# Patient Record
Sex: FEMALE | Race: WHITE | Employment: PART TIME | URBAN - METROPOLITAN AREA
[De-identification: names, ages, dates, MRNs, and addresses within clinical notes are randomized per-mention and may not be internally consistent; named-entity substitution may affect disease eponyms.]

---

## 2017-05-18 PROBLEM — R19.7 DIARRHEA: Status: ACTIVE | Noted: 2017-05-18

## 2017-11-15 PROBLEM — R76.8 ANA POSITIVE: Status: ACTIVE | Noted: 2017-11-15

## 2017-12-19 DIAGNOSIS — R30.0 DYSURIA: ICD-10-CM

## 2017-12-21 LAB — URINE CULTURE, ROUTINE: NORMAL

## 2017-12-28 DIAGNOSIS — R30.0 DYSURIA: ICD-10-CM

## 2017-12-30 LAB — URINE CULTURE, ROUTINE: NORMAL

## 2018-01-08 DIAGNOSIS — N30.00 ACUTE CYSTITIS WITHOUT HEMATURIA: ICD-10-CM

## 2018-01-08 DIAGNOSIS — R30.0 DYSURIA: ICD-10-CM

## 2018-01-08 LAB
C. TRACHOMATIS DNA ,URINE: NORMAL
N. GONORRHOEAE DNA, URINE: NORMAL

## 2018-01-10 LAB — URINE CULTURE, ROUTINE: NORMAL

## 2018-01-12 LAB
C. TRACHOMATIS DNA ,URINE: NEGATIVE
N. GONORRHOEAE DNA, URINE: NEGATIVE

## 2018-02-05 DIAGNOSIS — R30.0 DYSURIA: ICD-10-CM

## 2018-02-07 LAB — URINE CULTURE, ROUTINE: NORMAL

## 2018-03-14 DIAGNOSIS — R30.0 DYSURIA: ICD-10-CM

## 2018-03-16 LAB — URINE CULTURE, ROUTINE: NORMAL

## 2018-05-04 DIAGNOSIS — B37.31 CANDIDA VAGINITIS: ICD-10-CM

## 2018-05-04 DIAGNOSIS — R30.0 DYSURIA: ICD-10-CM

## 2018-05-06 LAB — URINE CULTURE, ROUTINE: NORMAL

## 2018-05-14 ENCOUNTER — PREP FOR PROCEDURE (OUTPATIENT)
Dept: SURGERY | Age: 23
End: 2018-05-14

## 2018-05-21 PROBLEM — D56.3 THALASSEMIA TRAIT: Chronic | Status: ACTIVE | Noted: 2018-05-21

## 2018-05-21 PROBLEM — R22.2 ABDOMINAL WALL MASS: Chronic | Status: ACTIVE | Noted: 2018-05-21

## 2018-06-13 DIAGNOSIS — N30.00 ACUTE CYSTITIS WITHOUT HEMATURIA: ICD-10-CM

## 2018-06-13 PROBLEM — N39.0 RECURRENT UTI: Status: ACTIVE | Noted: 2018-06-13

## 2018-06-15 LAB — URINE CULTURE, ROUTINE: NORMAL

## 2018-08-15 DIAGNOSIS — N39.0 RECURRENT UTI: ICD-10-CM

## 2018-08-17 LAB — URINE CULTURE, ROUTINE: NORMAL

## 2018-11-05 DIAGNOSIS — M25.50 ARTHRALGIA, UNSPECIFIED JOINT: ICD-10-CM

## 2018-11-05 DIAGNOSIS — Z86.61 HISTORY OF MENINGITIS: ICD-10-CM

## 2018-11-05 DIAGNOSIS — M79.7 FIBROMYALGIA: ICD-10-CM

## 2018-11-05 LAB
ALBUMIN SERPL-MCNC: 4.7 G/DL (ref 3.9–4.9)
ALP BLD-CCNC: 50 U/L (ref 40–130)
ALT SERPL-CCNC: 15 U/L (ref 0–33)
ANION GAP SERPL CALCULATED.3IONS-SCNC: 17 MEQ/L (ref 7–13)
AST SERPL-CCNC: 23 U/L (ref 0–35)
BILIRUB SERPL-MCNC: 0.8 MG/DL (ref 0–1.2)
BUN BLDV-MCNC: 12 MG/DL (ref 6–20)
CALCIUM SERPL-MCNC: 9.9 MG/DL (ref 8.6–10.2)
CHLORIDE BLD-SCNC: 105 MEQ/L (ref 98–107)
CO2: 19 MEQ/L (ref 22–29)
CREAT SERPL-MCNC: 0.58 MG/DL (ref 0.5–0.9)
GFR AFRICAN AMERICAN: >60
GFR NON-AFRICAN AMERICAN: >60
GLOBULIN: 3.2 G/DL (ref 2.3–3.5)
GLUCOSE BLD-MCNC: 78 MG/DL (ref 74–109)
HCT VFR BLD CALC: 35.2 % (ref 37–47)
HEMOGLOBIN: 11 G/DL (ref 12–16)
MCH RBC QN AUTO: 20.7 PG (ref 27–31.3)
MCHC RBC AUTO-ENTMCNC: 31.3 % (ref 33–37)
MCV RBC AUTO: 66 FL (ref 82–100)
PDW BLD-RTO: 15.1 % (ref 11.5–14.5)
PLATELET # BLD: 332 K/UL (ref 130–400)
POTASSIUM SERPL-SCNC: 4.2 MEQ/L (ref 3.5–5.1)
RBC # BLD: 5.34 M/UL (ref 4.2–5.4)
SODIUM BLD-SCNC: 141 MEQ/L (ref 132–144)
T4 FREE: 1.53 NG/DL (ref 0.93–1.7)
TOTAL PROTEIN: 7.9 G/DL (ref 6.4–8.1)
TSH SERPL DL<=0.05 MIU/L-ACNC: 1.81 UIU/ML (ref 0.27–4.2)
WBC # BLD: 6.6 K/UL (ref 4.8–10.8)

## 2018-11-08 LAB — LYME, EIA: 0.7 LIV (ref 0–1.2)

## 2018-12-06 DIAGNOSIS — N30.00 ACUTE CYSTITIS WITHOUT HEMATURIA: ICD-10-CM

## 2018-12-08 LAB — URINE CULTURE, ROUTINE: NORMAL

## 2019-02-05 DIAGNOSIS — N89.8 VAGINAL IRRITATION: ICD-10-CM

## 2019-02-07 LAB
C TRACH DNA GENITAL QL NAA+PROBE: NEGATIVE
GENITAL CULTURE, ROUTINE: NORMAL
N. GONORRHOEAE DNA: NEGATIVE

## 2019-04-12 DIAGNOSIS — R35.0 FREQUENCY OF URINATION: ICD-10-CM

## 2019-04-14 LAB — URINE CULTURE, ROUTINE: NORMAL

## 2021-12-02 DIAGNOSIS — N89.8 VAGINAL DISCHARGE: ICD-10-CM

## 2021-12-02 LAB
BACTERIA: ABNORMAL /HPF
BILIRUBIN URINE: NEGATIVE
BLOOD, URINE: NEGATIVE
CLARITY: CLEAR
COLOR: YELLOW
EPITHELIAL CELLS, UA: ABNORMAL /HPF (ref 0–5)
GLUCOSE URINE: NEGATIVE MG/DL
HYALINE CASTS: ABNORMAL /HPF (ref 0–5)
KETONES, URINE: NEGATIVE MG/DL
LEUKOCYTE ESTERASE, URINE: ABNORMAL
NITRITE, URINE: NEGATIVE
PH UA: 7.5 (ref 5–9)
PROTEIN UA: NEGATIVE MG/DL
RBC UA: ABNORMAL /HPF (ref 0–5)
SPECIFIC GRAVITY UA: 1 (ref 1–1.03)
UROBILINOGEN, URINE: 0.2 E.U./DL
WBC UA: ABNORMAL /HPF (ref 0–5)

## 2021-12-03 LAB
CLUE CELLS: NORMAL
TRICHOMONAS PREP: NORMAL
TRICHOMONAS VAGINALIS SCREEN: NEGATIVE
YEAST WET PREP: NORMAL

## 2021-12-04 LAB — URINE CULTURE, ROUTINE: NORMAL

## 2023-04-11 ENCOUNTER — OFFICE VISIT (OUTPATIENT)
Dept: PRIMARY CARE | Facility: CLINIC | Age: 28
End: 2023-04-11
Payer: COMMERCIAL

## 2023-04-11 VITALS
BODY MASS INDEX: 30.4 KG/M2 | DIASTOLIC BLOOD PRESSURE: 84 MMHG | TEMPERATURE: 98.7 F | WEIGHT: 161 LBS | OXYGEN SATURATION: 100 % | RESPIRATION RATE: 16 BRPM | HEART RATE: 75 BPM | HEIGHT: 61 IN | SYSTOLIC BLOOD PRESSURE: 118 MMHG

## 2023-04-11 DIAGNOSIS — R06.02 SHORTNESS OF BREATH: ICD-10-CM

## 2023-04-11 DIAGNOSIS — E53.8 B12 DEFICIENCY: ICD-10-CM

## 2023-04-11 DIAGNOSIS — R60.0 BILATERAL LOWER EXTREMITY EDEMA: ICD-10-CM

## 2023-04-11 DIAGNOSIS — R42 DIZZY SPELLS: Primary | ICD-10-CM

## 2023-04-11 PROBLEM — M51.36 BULGING LUMBAR DISC: Status: ACTIVE | Noted: 2023-04-11

## 2023-04-11 PROBLEM — E66.3 OVERWEIGHT (BMI 25.0-29.9): Status: ACTIVE | Noted: 2023-04-11

## 2023-04-11 PROBLEM — M79.7 FIBROMYALGIA: Status: ACTIVE | Noted: 2023-04-11

## 2023-04-11 PROBLEM — M51.369 BULGING LUMBAR DISC: Status: ACTIVE | Noted: 2023-04-11

## 2023-04-11 PROBLEM — R10.2 FEMALE PELVIC PAIN: Status: ACTIVE | Noted: 2023-04-11

## 2023-04-11 PROBLEM — E73.9 ADULT LACTASE DEFICIENCY: Status: ACTIVE | Noted: 2023-04-11

## 2023-04-11 PROBLEM — M54.9 CHRONIC BACK PAIN: Status: ACTIVE | Noted: 2023-04-11

## 2023-04-11 PROBLEM — G89.29 CHRONIC RUQ PAIN: Status: ACTIVE | Noted: 2023-04-11

## 2023-04-11 PROBLEM — B37.31 CANDIDIASIS OF VAGINA: Status: ACTIVE | Noted: 2023-04-11

## 2023-04-11 PROBLEM — R10.11 CHRONIC RUQ PAIN: Status: ACTIVE | Noted: 2023-04-11

## 2023-04-11 PROBLEM — F43.0 PANIC ATTACK AS REACTION TO STRESS: Status: ACTIVE | Noted: 2023-04-11

## 2023-04-11 PROBLEM — R13.10 DYSPHAGIA, IDIOPATHIC: Status: ACTIVE | Noted: 2023-04-11

## 2023-04-11 PROBLEM — E28.2 PCOS (POLYCYSTIC OVARIAN SYNDROME): Status: ACTIVE | Noted: 2023-04-11

## 2023-04-11 PROBLEM — D64.9 ANEMIA: Status: ACTIVE | Noted: 2023-04-11

## 2023-04-11 PROBLEM — L70.9 ACNE: Status: ACTIVE | Noted: 2023-04-11

## 2023-04-11 PROBLEM — M21.70 LEG LENGTH DISCREPANCY: Status: ACTIVE | Noted: 2023-04-11

## 2023-04-11 PROBLEM — N94.3 PMS (PREMENSTRUAL SYNDROME): Status: ACTIVE | Noted: 2023-04-11

## 2023-04-11 PROBLEM — G43.709 MIGRAINE, CHRONIC, WITHOUT AURA: Status: ACTIVE | Noted: 2023-04-11

## 2023-04-11 PROBLEM — F41.0 PANIC ATTACK AS REACTION TO STRESS: Status: ACTIVE | Noted: 2023-04-11

## 2023-04-11 PROBLEM — E66.811 CLASS 1 OBESITY DUE TO EXCESS CALORIES WITH BODY MASS INDEX (BMI) OF 30.0 TO 30.9 IN ADULT: Status: ACTIVE | Noted: 2023-04-11

## 2023-04-11 PROBLEM — E55.9 VITAMIN D DEFICIENCY: Status: ACTIVE | Noted: 2023-04-11

## 2023-04-11 PROBLEM — M54.16 LUMBAR RADICULOPATHY: Status: ACTIVE | Noted: 2023-04-11

## 2023-04-11 PROBLEM — Z86.2 H/O THALASSEMIA MINOR: Status: ACTIVE | Noted: 2023-04-11

## 2023-04-11 PROBLEM — K21.9 GERD (GASTROESOPHAGEAL REFLUX DISEASE): Status: ACTIVE | Noted: 2023-04-11

## 2023-04-11 PROBLEM — R39.9 UTI SYMPTOMS: Status: ACTIVE | Noted: 2023-04-11

## 2023-04-11 PROBLEM — E66.09 CLASS 1 OBESITY DUE TO EXCESS CALORIES WITH BODY MASS INDEX (BMI) OF 30.0 TO 30.9 IN ADULT: Status: ACTIVE | Noted: 2023-04-11

## 2023-04-11 PROBLEM — B37.31 RECURRENT CANDIDIASIS OF VAGINA: Status: ACTIVE | Noted: 2023-04-11

## 2023-04-11 PROBLEM — F41.9 ANXIETY DISORDER: Status: ACTIVE | Noted: 2023-04-11

## 2023-04-11 PROBLEM — R82.998 URINE WBC INCREASED: Status: ACTIVE | Noted: 2023-04-11

## 2023-04-11 PROBLEM — R53.83 FATIGUE: Status: ACTIVE | Noted: 2023-04-11

## 2023-04-11 PROBLEM — G89.29 CHRONIC BACK PAIN: Status: ACTIVE | Noted: 2023-04-11

## 2023-04-11 PROBLEM — R31.9 HEMATURIA: Status: ACTIVE | Noted: 2023-04-11

## 2023-04-11 PROBLEM — J32.0 MAXILLARY SINUSITIS: Status: ACTIVE | Noted: 2023-04-11

## 2023-04-11 PROBLEM — F41.8 SITUATIONAL ANXIETY: Status: ACTIVE | Noted: 2023-04-11

## 2023-04-11 PROBLEM — R00.2 HEART PALPITATIONS: Status: ACTIVE | Noted: 2023-04-11

## 2023-04-11 PROBLEM — E78.2 MIXED HYPERLIPIDEMIA: Status: ACTIVE | Noted: 2023-04-11

## 2023-04-11 PROBLEM — N39.0 FREQUENT UTI: Status: ACTIVE | Noted: 2023-04-11

## 2023-04-11 PROBLEM — R94.6 ABNORMAL THYROID FUNCTION TEST: Status: ACTIVE | Noted: 2023-04-11

## 2023-04-11 PROBLEM — J34.3 HYPERTROPHY OF BOTH INFERIOR NASAL TURBINATES: Status: ACTIVE | Noted: 2023-04-11

## 2023-04-11 PROBLEM — J34.2 NASAL SEPTAL DEVIATION: Status: ACTIVE | Noted: 2023-04-11

## 2023-04-11 PROBLEM — M53.86 SCIATICA OF LEFT SIDE ASSOCIATED WITH DISORDER OF LUMBAR SPINE: Status: ACTIVE | Noted: 2023-04-11

## 2023-04-11 PROCEDURE — 99213 OFFICE O/P EST LOW 20 MIN: CPT | Performed by: PHYSICIAN ASSISTANT

## 2023-04-11 PROCEDURE — 93000 ELECTROCARDIOGRAM COMPLETE: CPT | Performed by: PHYSICIAN ASSISTANT

## 2023-04-11 PROCEDURE — 1036F TOBACCO NON-USER: CPT | Performed by: PHYSICIAN ASSISTANT

## 2023-04-11 RX ORDER — GINSENG 100 MG
CAPSULE ORAL
COMMUNITY
Start: 2019-07-05

## 2023-04-11 RX ORDER — MAGNESIUM 250 MG
1 TABLET ORAL DAILY
COMMUNITY

## 2023-04-11 RX ORDER — MELOXICAM 15 MG/1
1 TABLET ORAL DAILY
COMMUNITY
Start: 2022-12-21 | End: 2023-06-27 | Stop reason: ALTCHOICE

## 2023-04-11 RX ORDER — NORGESTIMATE AND ETHINYL ESTRADIOL 0.25-0.035
KIT ORAL
COMMUNITY
Start: 2022-07-26 | End: 2023-07-31 | Stop reason: SDUPTHER

## 2023-04-11 RX ORDER — ACETAMINOPHEN 500 MG
1 TABLET ORAL DAILY
COMMUNITY
Start: 2019-07-05

## 2023-04-11 RX ORDER — IBREXAFUNGERP 150 MG/1
TABLET, FILM COATED ORAL
COMMUNITY
Start: 2022-08-31 | End: 2023-06-27 | Stop reason: ALTCHOICE

## 2023-04-11 RX ORDER — PUMPKIN SEED EXTRACT/SOY GERM 300 MG
CAPSULE ORAL
COMMUNITY
Start: 2019-07-05

## 2023-04-11 RX ORDER — LIDOCAINE 50 MG/G
1 PATCH TOPICAL EVERY 12 HOURS
COMMUNITY
Start: 2022-12-21

## 2023-04-11 RX ORDER — TIZANIDINE 2 MG/1
TABLET ORAL
COMMUNITY
Start: 2022-12-21 | End: 2023-06-27 | Stop reason: ALTCHOICE

## 2023-04-11 RX ORDER — SUMATRIPTAN SUCCINATE 100 MG/1
TABLET ORAL
COMMUNITY
Start: 2016-06-30 | End: 2023-04-27 | Stop reason: SDUPTHER

## 2023-04-11 NOTE — PROGRESS NOTES
"Subjective   Patient ID: Isabel Mead is a 28 y.o. female who presents for Edema (Dr Cantu pt here today for bilateral feet/ankles that started 2 months ago. Chiropractor wanted her seen ), Dizziness (With some SOB past month ), and Pain (Bottom of feet pain going up to knee area past 2 months).    HPI     Pt presents for swelling b/l lower extremities:  - Onset: 2 months ago   - Her chiropractor did press test and pitting edema  - Associated with some dizziness and some SOB - past month. Notices thse at random times, was at work and taling to someone and it came on. A little winded when going up and down stairs   - No chest pains   - No history of lung or heart disease   - When she was a kid had a borderline heart issue she doesn't know what it was but they did echo and said was all ok   - H/o anemia - takes iron sometimes now  - H/o low B12 as well       Review of Systems   Skin:         B/l leg swelling       Objective   /84   Pulse 75   Temp 37.1 °C (98.7 °F)   Resp 16   Ht 1.549 m (5' 1\")   Wt 73 kg (161 lb)   SpO2 100%   BMI 30.42 kg/m²     Physical Exam  Constitutional:       Appearance: Normal appearance.   Cardiovascular:      Rate and Rhythm: Normal rate and regular rhythm.      Pulses: Normal pulses.      Heart sounds: Normal heart sounds. No murmur heard.  Pulmonary:      Effort: Pulmonary effort is normal.      Breath sounds: Normal breath sounds.   Musculoskeletal:         General: Swelling (mild, 1+pitting b/l lower extremities) present.   Neurological:      Mental Status: She is alert.   Psychiatric:         Mood and Affect: Mood and affect normal.         Assessment/Plan   Problem List Items Addressed This Visit    None  Visit Diagnoses       Dizzy spells    -  Primary    Relevant Orders    Iron and TIBC    Ferritin    B-type natriuretic peptide    ECG 12 lead (Completed)    Shortness of breath        Relevant Orders    Comprehensive Metabolic Panel    CBC    Iron and TIBC    " Ferritin    B-type natriuretic peptide    ECG 12 lead (Completed)    Bilateral lower extremity edema        Relevant Orders    Comprehensive Metabolic Panel    CBC    Iron and TIBC    Ferritin    B-type natriuretic peptide    ECG 12 lead (Completed)    B12 deficiency        Relevant Orders    Vitamin B12          B/L LOWER EXTREMITY EDEMA:  - Just mild 1+ pitting on exam today   - Starting to notice some SOB at times and dizziness   - H/o iron deficiency anemia - will check labs  - ECG normal today   - Consider echo if other results non-revealing (pt states some h/o heart condition as child unsure what and had echo and they said not to worry) - no murmur on exam

## 2023-04-12 ENCOUNTER — LAB (OUTPATIENT)
Dept: LAB | Facility: LAB | Age: 28
End: 2023-04-12
Payer: COMMERCIAL

## 2023-04-12 DIAGNOSIS — R42 DIZZY SPELLS: ICD-10-CM

## 2023-04-12 DIAGNOSIS — R06.02 SHORTNESS OF BREATH: ICD-10-CM

## 2023-04-12 DIAGNOSIS — R60.0 BILATERAL LOWER EXTREMITY EDEMA: ICD-10-CM

## 2023-04-12 DIAGNOSIS — E53.8 B12 DEFICIENCY: ICD-10-CM

## 2023-04-12 LAB
ALANINE AMINOTRANSFERASE (SGPT) (U/L) IN SER/PLAS: 15 U/L (ref 7–45)
ALBUMIN (G/DL) IN SER/PLAS: 4.5 G/DL (ref 3.4–5)
ALKALINE PHOSPHATASE (U/L) IN SER/PLAS: 45 U/L (ref 33–110)
ANION GAP IN SER/PLAS: 12 MMOL/L (ref 10–20)
ASPARTATE AMINOTRANSFERASE (SGOT) (U/L) IN SER/PLAS: 15 U/L (ref 9–39)
BILIRUBIN TOTAL (MG/DL) IN SER/PLAS: 0.8 MG/DL (ref 0–1.2)
CALCIUM (MG/DL) IN SER/PLAS: 9.8 MG/DL (ref 8.6–10.3)
CARBON DIOXIDE, TOTAL (MMOL/L) IN SER/PLAS: 28 MMOL/L (ref 21–32)
CHLORIDE (MMOL/L) IN SER/PLAS: 102 MMOL/L (ref 98–107)
COBALAMIN (VITAMIN B12) (PG/ML) IN SER/PLAS: 179 PG/ML (ref 211–911)
CREATININE (MG/DL) IN SER/PLAS: 0.67 MG/DL (ref 0.5–1.05)
ERYTHROCYTE DISTRIBUTION WIDTH (RATIO) BY AUTOMATED COUNT: 15.3 % (ref 11.5–14.5)
ERYTHROCYTE MEAN CORPUSCULAR HEMOGLOBIN CONCENTRATION (G/DL) BY AUTOMATED: 30.3 G/DL (ref 32–36)
ERYTHROCYTE MEAN CORPUSCULAR VOLUME (FL) BY AUTOMATED COUNT: 67 FL (ref 80–100)
ERYTHROCYTES (10*6/UL) IN BLOOD BY AUTOMATED COUNT: 5.5 X10E12/L (ref 4–5.2)
GFR FEMALE: >90 ML/MIN/1.73M2
GLUCOSE (MG/DL) IN SER/PLAS: 85 MG/DL (ref 74–99)
HEMATOCRIT (%) IN BLOOD BY AUTOMATED COUNT: 37 % (ref 36–46)
HEMOGLOBIN (G/DL) IN BLOOD: 11.2 G/DL (ref 12–16)
IRON (UG/DL) IN SER/PLAS: 115 UG/DL (ref 35–150)
IRON BINDING CAPACITY (UG/DL) IN SER/PLAS: 387 UG/DL (ref 240–445)
IRON SATURATION (%) IN SER/PLAS: 30 % (ref 25–45)
LEUKOCYTES (10*3/UL) IN BLOOD BY AUTOMATED COUNT: 8 X10E9/L (ref 4.4–11.3)
NATRIURETIC PEPTIDE B (PG/ML) IN SER/PLAS: 47 PG/ML (ref 0–99)
PLATELETS (10*3/UL) IN BLOOD AUTOMATED COUNT: 365 X10E9/L (ref 150–450)
POTASSIUM (MMOL/L) IN SER/PLAS: 4.4 MMOL/L (ref 3.5–5.3)
PROTEIN TOTAL: 7.4 G/DL (ref 6.4–8.2)
SODIUM (MMOL/L) IN SER/PLAS: 138 MMOL/L (ref 136–145)
UREA NITROGEN (MG/DL) IN SER/PLAS: 13 MG/DL (ref 6–23)

## 2023-04-12 PROCEDURE — 36415 COLL VENOUS BLD VENIPUNCTURE: CPT

## 2023-04-12 PROCEDURE — 83880 ASSAY OF NATRIURETIC PEPTIDE: CPT

## 2023-04-12 PROCEDURE — 85027 COMPLETE CBC AUTOMATED: CPT

## 2023-04-12 PROCEDURE — 83550 IRON BINDING TEST: CPT

## 2023-04-12 PROCEDURE — 82607 VITAMIN B-12: CPT

## 2023-04-12 PROCEDURE — 83540 ASSAY OF IRON: CPT

## 2023-04-12 PROCEDURE — 82728 ASSAY OF FERRITIN: CPT

## 2023-04-12 PROCEDURE — 80053 COMPREHEN METABOLIC PANEL: CPT

## 2023-04-13 DIAGNOSIS — E53.8 VITAMIN B12 DEFICIENCY: Primary | ICD-10-CM

## 2023-04-13 LAB — FERRITIN (UG/LL) IN SER/PLAS: 145 UG/L (ref 8–150)

## 2023-04-13 RX ORDER — SYRINGE W-NEEDLE,DISPOSAB,3 ML 25GX5/8"
SYRINGE, EMPTY DISPOSABLE MISCELLANEOUS
Qty: 6 EACH | Refills: 0 | Status: SHIPPED | OUTPATIENT
Start: 2023-04-13

## 2023-04-13 RX ORDER — CYANOCOBALAMIN 1000 UG/ML
INJECTION, SOLUTION INTRAMUSCULAR; SUBCUTANEOUS
Qty: 6 ML | Refills: 0 | Status: SHIPPED | OUTPATIENT
Start: 2023-04-13

## 2023-04-27 DIAGNOSIS — G43.709 CHRONIC MIGRAINE WITHOUT AURA WITHOUT STATUS MIGRAINOSUS, NOT INTRACTABLE: Primary | ICD-10-CM

## 2023-04-27 RX ORDER — SUMATRIPTAN SUCCINATE 100 MG/1
TABLET ORAL
Qty: 9 TABLET | Refills: 3 | Status: SHIPPED | OUTPATIENT
Start: 2023-04-27 | End: 2023-12-26 | Stop reason: SDUPTHER

## 2023-06-16 PROBLEM — J32.0 MAXILLARY SINUSITIS: Status: RESOLVED | Noted: 2023-04-11 | Resolved: 2023-06-16

## 2023-06-27 ENCOUNTER — OFFICE VISIT (OUTPATIENT)
Dept: PRIMARY CARE | Facility: CLINIC | Age: 28
End: 2023-06-27
Payer: COMMERCIAL

## 2023-06-27 VITALS
WEIGHT: 162 LBS | RESPIRATION RATE: 16 BRPM | HEIGHT: 61 IN | OXYGEN SATURATION: 100 % | DIASTOLIC BLOOD PRESSURE: 60 MMHG | BODY MASS INDEX: 30.58 KG/M2 | HEART RATE: 72 BPM | TEMPERATURE: 98 F | SYSTOLIC BLOOD PRESSURE: 106 MMHG

## 2023-06-27 DIAGNOSIS — M51.36 BULGING LUMBAR DISC: ICD-10-CM

## 2023-06-27 DIAGNOSIS — K21.9 GASTROESOPHAGEAL REFLUX DISEASE, UNSPECIFIED WHETHER ESOPHAGITIS PRESENT: ICD-10-CM

## 2023-06-27 DIAGNOSIS — M25.562 ACUTE PAIN OF BOTH KNEES: Primary | ICD-10-CM

## 2023-06-27 DIAGNOSIS — Z86.2 H/O THALASSEMIA MINOR: ICD-10-CM

## 2023-06-27 DIAGNOSIS — M79.7 FIBROMYALGIA: ICD-10-CM

## 2023-06-27 DIAGNOSIS — E28.2 PCOS (POLYCYSTIC OVARIAN SYNDROME): ICD-10-CM

## 2023-06-27 DIAGNOSIS — N83.201 RIGHT OVARIAN CYST: ICD-10-CM

## 2023-06-27 DIAGNOSIS — F41.9 ANXIETY DISORDER, UNSPECIFIED TYPE: ICD-10-CM

## 2023-06-27 DIAGNOSIS — D64.9 ANEMIA, UNSPECIFIED TYPE: ICD-10-CM

## 2023-06-27 DIAGNOSIS — M25.561 ACUTE PAIN OF BOTH KNEES: Primary | ICD-10-CM

## 2023-06-27 PROBLEM — R20.0 NUMBNESS OF HAND: Status: RESOLVED | Noted: 2023-06-27 | Resolved: 2023-06-27

## 2023-06-27 PROBLEM — R42 DIZZINESS: Status: ACTIVE | Noted: 2023-04-12

## 2023-06-27 PROBLEM — N94.6 DYSMENORRHEA: Status: RESOLVED | Noted: 2023-06-27 | Resolved: 2023-06-27

## 2023-06-27 PROBLEM — N93.9 ABNORMAL UTERINE BLEEDING: Status: RESOLVED | Noted: 2023-06-27 | Resolved: 2023-06-27

## 2023-06-27 PROBLEM — N23 RENAL COLIC: Status: RESOLVED | Noted: 2023-06-27 | Resolved: 2023-06-27

## 2023-06-27 PROBLEM — G50.1 ATYPICAL FACIAL PAIN: Status: RESOLVED | Noted: 2023-06-27 | Resolved: 2023-06-27

## 2023-06-27 PROBLEM — M25.50 ARTHRALGIA: Status: RESOLVED | Noted: 2023-06-27 | Resolved: 2023-06-27

## 2023-06-27 PROBLEM — G43.909 MIGRAINE HEADACHE: Status: RESOLVED | Noted: 2023-04-11 | Resolved: 2023-06-27

## 2023-06-27 PROBLEM — R94.6 ABNORMAL FINDING ON THYROID FUNCTION TEST: Status: RESOLVED | Noted: 2022-07-21 | Resolved: 2023-06-27

## 2023-06-27 PROBLEM — R31.9 HEMATURIA: Status: RESOLVED | Noted: 2023-04-11 | Resolved: 2023-06-27

## 2023-06-27 PROBLEM — E04.9 GOITER: Status: ACTIVE | Noted: 2023-06-27

## 2023-06-27 PROBLEM — D56.3 THALASSEMIA TRAIT: Status: RESOLVED | Noted: 2018-04-17 | Resolved: 2023-06-27

## 2023-06-27 PROBLEM — R60.0 EDEMA OF BOTH LOWER EXTREMITIES: Status: RESOLVED | Noted: 2023-04-12 | Resolved: 2023-06-27

## 2023-06-27 PROCEDURE — 1036F TOBACCO NON-USER: CPT | Performed by: FAMILY MEDICINE

## 2023-06-27 PROCEDURE — 99214 OFFICE O/P EST MOD 30 MIN: CPT | Performed by: FAMILY MEDICINE

## 2023-06-27 RX ORDER — DICLOFENAC SODIUM 10 MG/G
GEL TOPICAL
Qty: 100 G | Refills: 3 | Status: SHIPPED | OUTPATIENT
Start: 2023-06-27

## 2023-06-27 RX ORDER — FLUCONAZOLE 150 MG/1
150 TABLET ORAL ONCE
COMMUNITY

## 2023-06-27 RX ORDER — DICLOFENAC SODIUM 10 MG/G
4 GEL TOPICAL 4 TIMES DAILY PRN
Qty: 100 G | Refills: 3 | Status: SHIPPED | OUTPATIENT
Start: 2023-06-27 | End: 2023-06-27

## 2023-06-27 RX ORDER — DICLOFENAC SODIUM 10 MG/G
4 GEL TOPICAL 4 TIMES DAILY
Qty: 45 G | Refills: 3 | Status: SHIPPED | OUTPATIENT
Start: 2023-06-27 | End: 2023-06-27

## 2023-06-27 NOTE — PROGRESS NOTES
Subjective   Patient ID: Isabel Mead is a 28 y.o. female who presents for Polycystic Ovary Syndrome (Pelvic pain, had cyst on ovary), Dizziness (Has been getting b12 injections), and Knee Pain (Bilateral down into legs x 3 weeks).  Covid vax: declined  Pap: 7/2022  Lmp: 6/21/23  Foot pain now radiating up leg into knees  Is seeing massotherapy a cream helped  Reqs referral to gyn  Dizziness intermittent    HPI  Patient Active Problem List   Diagnosis    Adult lactase deficiency    Acne    Anemia    Anxiety disorder    Situational anxiety    Bulging lumbar disc    Chronic back pain    Fibromyalgia    Chronic RUQ pain    Dysphagia, idiopathic    Fatigue    Female pelvic pain    Frequent UTI    Recurrent candidiasis of vagina    GERD (gastroesophageal reflux disease)    H/O thalassemia minor    Heart palpitations    Abnormal thyroid function test    Hypertrophy of both inferior nasal turbinates    Leg length discrepancy    Lumbar radiculopathy    Migraine, chronic, without aura    Mixed hyperlipidemia    Nasal septal deviation    Overweight (BMI 25.0-29.9)    Panic attack as reaction to stress    PCOS (polycystic ovarian syndrome)    Sciatica of left side associated with disorder of lumbar spine    PMS (premenstrual syndrome)    UTI symptoms    Vitamin B12 deficiency    Vitamin D deficiency    Class 1 obesity due to excess calories with body mass index (BMI) of 30.0 to 30.9 in adult    Goiter    Dizziness       Past Surgical History:   Procedure Laterality Date    ESOPHAGOGASTRODUODENOSCOPY  2018    TONSILLECTOMY  2008    WISDOM TOOTH EXTRACTION  2014       Review of Systems no sz mi or cva    This patient has   NO history of recent Covid nor flu symptoms,  NO Fever nor chills,  NO Chest pain, shortness of breath nor paroxysmal nocturnal dyspnea,  NO Nausea, vomiting, nor diarrhea,  NO Hematochezia nor melena,  NO Dysuria, hematuria, nor new incontinence issues  NO new severe headaches nor neurological  "complaints,  NO new issues with anxiety nor depression nor new psychiatric complaints,  NO suicidal nor homicidal ideations.     OBJECTIVE:  /60   Pulse 72   Temp 36.7 °C (98 °F) (Temporal)   Resp 16   Ht 1.549 m (5' 1\")   Wt 73.5 kg (162 lb)   LMP 06/21/2023 (Approximate)   SpO2 100%   BMI 30.61 kg/m²      General:  alert, oriented, no acute distress.  No obvious skin rashes noted.   No gait disturbance noted.    Mood is pleasant, not tearful, no signs of emotional distress.  Not appearing intoxicated or altered.   No voiced delusions,   Normal, appropriate behavior.    HEENT: Normocephalic, atraumatic,   Pupils round, reactive to light  Extraocular motions intact and wnl  Tympanic membranes normal    Neck: no nuchal rigidity  No masses palpable.  No carotid bruits.  No thyromegaly.    Respiratory: Equal breath sounds  No wheezes,    rales,    nor rhonchi  No respiratory distress.    Heart: Regular rate and rhythm, no    murmurs  no rubs/gallops    Abdomen: no masses palpable, nontender, no rebound nor guarding.    Extremities: NO cyanosis noted, no clubbing.   No edema noted.  2+dorsalis pedis pulses.    Normal-not antalgic, steady gait.  NL gait  NL knee rom mild medial knee pain    Lab on 04/12/2023   Component Date Value Ref Range Status    Vitamin B-12 04/12/2023 179 (L)  211 - 911 pg/mL Final    BNP 04/12/2023 47  0 - 99 pg/mL Final    .  <100 pg/mL - Heart failure unlikely  100-299 pg/mL - Intermediate probability of acute heart  .               failure exacerbation. Correlate with clinical  .               context and patient history.    >=300 pg/mL - Heart Failure likely. Correlate with clinical  .               context and patient history.  BNP testing is performed using different testing   methodology at [facility] than at other   [facility] hospitals. Direct result comparisons should   only be made within the same method.    Ferritin 04/12/2023 145  8 - 150 ug/L Final    Iron " 04/12/2023 115  35 - 150 ug/dL Final    TIBC 04/12/2023 387  240 - 445 ug/dL Final    Iron Saturation 04/12/2023 30  25 - 45 % Final    WBC 04/12/2023 8.0  4.4 - 11.3 x10E9/L Final    RBC 04/12/2023 5.50 (H)  4.00 - 5.20 x10E12/L Final    Hemoglobin 04/12/2023 11.2 (L)  12.0 - 16.0 g/dL Final    Hematocrit 04/12/2023 37.0  36.0 - 46.0 % Final    MCV 04/12/2023 67 (L)  80 - 100 fL Final    MCHC 04/12/2023 30.3 (L)  32.0 - 36.0 g/dL Final    Platelets 04/12/2023 365  150 - 450 x10E9/L Final    RDW 04/12/2023 15.3 (H)  11.5 - 14.5 % Final    Glucose 04/12/2023 85  74 - 99 mg/dL Final    Sodium 04/12/2023 138  136 - 145 mmol/L Final    Potassium 04/12/2023 4.4  3.5 - 5.3 mmol/L Final    Chloride 04/12/2023 102  98 - 107 mmol/L Final    Bicarbonate 04/12/2023 28  21 - 32 mmol/L Final    Anion Gap 04/12/2023 12  10 - 20 mmol/L Final    Urea Nitrogen 04/12/2023 13  6 - 23 mg/dL Final    Creatinine 04/12/2023 0.67  0.50 - 1.05 mg/dL Final    GFR Female 04/12/2023 >90  >90 mL/min/1.73m2 Final     CALCULATIONS OF ESTIMATED GFR ARE PERFORMED   USING THE 2021 CKD-EPI STUDY REFIT EQUATION   WITHOUT THE RACE VARIABLE FOR THE IDMS-TRACEABLE   CREATININE METHODS.    https://jasn.asnjournals.org/content/early/2021/09/22/ASN.[email or web address]    Calcium 04/12/2023 9.8  8.6 - 10.3 mg/dL Final    Albumin 04/12/2023 4.5  3.4 - 5.0 g/dL Final    Alkaline Phosphatase 04/12/2023 45  33 - 110 U/L Final    Total Protein 04/12/2023 7.4  6.4 - 8.2 g/dL Final    AST 04/12/2023 15  9 - 39 U/L Final    Total Bilirubin 04/12/2023 0.8  0.0 - 1.2 mg/dL Final    ALT (SGPT) 04/12/2023 15  7 - 45 U/L Final     Patients treated with Sulfasalazine may generate    falsely decreased results for ALT.        Assessment/Plan     Problem List Items Addressed This Visit       Anemia    Anxiety disorder    Bulging lumbar disc    GERD (gastroesophageal reflux disease)    H/O thalassemia minor    PCOS (polycystic ovarian syndrome)       Consider knee xr  Ortho referral  if persists  Could be fibro  Las in 4mo or so  Cont b12    Take prenatal vitamin  Labs soon  The patient is aware that results will be forthcoming of ALL planned labs and or tests. If no results are received on my chart or by letter within 1 - 3 weeks, the patient is aware they need to call to obtain results, as this is not usual. Also, if any new conditions arise, or current condition worsens, it is understood that sooner appointment should be made or urgent care/convenient care or emergency room treatment should be sought depending on severity. Otherwise follow up for recheck at regular intervals as we have discussed, at least yearly.  Return if still w sxs

## 2023-06-29 ENCOUNTER — TELEPHONE (OUTPATIENT)
Dept: PRIMARY CARE | Facility: CLINIC | Age: 28
End: 2023-06-29
Payer: COMMERCIAL

## 2023-06-29 NOTE — TELEPHONE ENCOUNTER
Patient scheduled for ultra sound tomorrow 06/30/23 at [facility] and would like to know if Dr. Cantu would add to it so they look at her appendix because she has been having pain in that area.

## 2023-06-30 DIAGNOSIS — R10.31 ABDOMINAL PAIN, RIGHT LOWER QUADRANT: ICD-10-CM

## 2023-07-31 DIAGNOSIS — E28.2 PCOS (POLYCYSTIC OVARIAN SYNDROME): Primary | ICD-10-CM

## 2023-07-31 RX ORDER — NORGESTIMATE AND ETHINYL ESTRADIOL 0.25-0.035
1 KIT ORAL DAILY
Qty: 28 TABLET | Refills: 1 | Status: SHIPPED | OUTPATIENT
Start: 2023-07-31

## 2023-08-16 ENCOUNTER — LAB (OUTPATIENT)
Dept: LAB | Facility: LAB | Age: 28
End: 2023-08-16
Payer: COMMERCIAL

## 2023-08-16 DIAGNOSIS — D64.9 ANEMIA, UNSPECIFIED TYPE: ICD-10-CM

## 2023-08-16 LAB
ALANINE AMINOTRANSFERASE (SGPT) (U/L) IN SER/PLAS: 20 U/L (ref 7–45)
ALBUMIN (G/DL) IN SER/PLAS: 4.4 G/DL (ref 3.4–5)
ALKALINE PHOSPHATASE (U/L) IN SER/PLAS: 48 U/L (ref 33–110)
ANION GAP IN SER/PLAS: 13 MMOL/L (ref 10–20)
ASPARTATE AMINOTRANSFERASE (SGOT) (U/L) IN SER/PLAS: 17 U/L (ref 9–39)
BASOPHILS (10*3/UL) IN BLOOD BY AUTOMATED COUNT: 0.09 X10E9/L (ref 0–0.1)
BASOPHILS/100 LEUKOCYTES IN BLOOD BY AUTOMATED COUNT: 1.2 % (ref 0–2)
BILIRUBIN TOTAL (MG/DL) IN SER/PLAS: 1 MG/DL (ref 0–1.2)
CALCIUM (MG/DL) IN SER/PLAS: 9.6 MG/DL (ref 8.6–10.3)
CARBON DIOXIDE, TOTAL (MMOL/L) IN SER/PLAS: 28 MMOL/L (ref 21–32)
CHLORIDE (MMOL/L) IN SER/PLAS: 103 MMOL/L (ref 98–107)
COBALAMIN (VITAMIN B12) (PG/ML) IN SER/PLAS: 264 PG/ML (ref 211–911)
CREATININE (MG/DL) IN SER/PLAS: 0.68 MG/DL (ref 0.5–1.05)
EOSINOPHILS (10*3/UL) IN BLOOD BY AUTOMATED COUNT: 0.16 X10E9/L (ref 0–0.7)
EOSINOPHILS/100 LEUKOCYTES IN BLOOD BY AUTOMATED COUNT: 2 % (ref 0–6)
ERYTHROCYTE DISTRIBUTION WIDTH (RATIO) BY AUTOMATED COUNT: 15.5 % (ref 11.5–14.5)
ERYTHROCYTE MEAN CORPUSCULAR HEMOGLOBIN CONCENTRATION (G/DL) BY AUTOMATED: 30.9 G/DL (ref 32–36)
ERYTHROCYTE MEAN CORPUSCULAR VOLUME (FL) BY AUTOMATED COUNT: 67 FL (ref 80–100)
ERYTHROCYTES (10*6/UL) IN BLOOD BY AUTOMATED COUNT: 5.37 X10E12/L (ref 4–5.2)
GFR FEMALE: >90 ML/MIN/1.73M2
GLUCOSE (MG/DL) IN SER/PLAS: 77 MG/DL (ref 74–99)
HEMATOCRIT (%) IN BLOOD BY AUTOMATED COUNT: 35.9 % (ref 36–46)
HEMOGLOBIN (G/DL) IN BLOOD: 11.1 G/DL (ref 12–16)
IMMATURE GRANULOCYTES/100 LEUKOCYTES IN BLOOD BY AUTOMATED COUNT: 0.3 % (ref 0–0.9)
LEUKOCYTES (10*3/UL) IN BLOOD BY AUTOMATED COUNT: 7.8 X10E9/L (ref 4.4–11.3)
LYMPHOCYTES (10*3/UL) IN BLOOD BY AUTOMATED COUNT: 2.43 X10E9/L (ref 1.2–4.8)
LYMPHOCYTES/100 LEUKOCYTES IN BLOOD BY AUTOMATED COUNT: 31.1 % (ref 13–44)
MONOCYTES (10*3/UL) IN BLOOD BY AUTOMATED COUNT: 0.5 X10E9/L (ref 0.1–1)
MONOCYTES/100 LEUKOCYTES IN BLOOD BY AUTOMATED COUNT: 6.4 % (ref 2–10)
NEUTROPHILS (10*3/UL) IN BLOOD BY AUTOMATED COUNT: 4.61 X10E9/L (ref 1.2–7.7)
NEUTROPHILS/100 LEUKOCYTES IN BLOOD BY AUTOMATED COUNT: 59 % (ref 40–80)
PLATELETS (10*3/UL) IN BLOOD AUTOMATED COUNT: 339 X10E9/L (ref 150–450)
POTASSIUM (MMOL/L) IN SER/PLAS: 4.6 MMOL/L (ref 3.5–5.3)
PROTEIN TOTAL: 7.2 G/DL (ref 6.4–8.2)
SODIUM (MMOL/L) IN SER/PLAS: 139 MMOL/L (ref 136–145)
THYROTROPIN (MIU/L) IN SER/PLAS BY DETECTION LIMIT <= 0.05 MIU/L: 1.12 MIU/L (ref 0.44–3.98)
THYROXINE (T4) FREE (NG/DL) IN SER/PLAS: 0.89 NG/DL (ref 0.61–1.12)
UREA NITROGEN (MG/DL) IN SER/PLAS: 10 MG/DL (ref 6–23)

## 2023-08-16 PROCEDURE — 83036 HEMOGLOBIN GLYCOSYLATED A1C: CPT

## 2023-08-16 PROCEDURE — 36415 COLL VENOUS BLD VENIPUNCTURE: CPT

## 2023-08-16 PROCEDURE — 85025 COMPLETE CBC W/AUTO DIFF WBC: CPT

## 2023-08-16 PROCEDURE — 84443 ASSAY THYROID STIM HORMONE: CPT

## 2023-08-16 PROCEDURE — 80053 COMPREHEN METABOLIC PANEL: CPT

## 2023-08-16 PROCEDURE — 82607 VITAMIN B-12: CPT

## 2023-08-16 PROCEDURE — 84439 ASSAY OF FREE THYROXINE: CPT

## 2023-08-17 LAB
ESTIMATED AVERAGE GLUCOSE FOR HBA1C: 91 MG/DL
HEMOGLOBIN A1C/HEMOGLOBIN TOTAL IN BLOOD: 4.8 %

## 2023-08-21 DIAGNOSIS — R79.9 ABNORMAL BLOOD FINDINGS: ICD-10-CM

## 2023-08-30 ENCOUNTER — OFFICE VISIT (OUTPATIENT)
Dept: OBGYN CLINIC | Age: 28
End: 2023-08-30
Payer: COMMERCIAL

## 2023-08-30 ENCOUNTER — TELEPHONE (OUTPATIENT)
Dept: OBGYN CLINIC | Age: 28
End: 2023-08-30

## 2023-08-30 VITALS
DIASTOLIC BLOOD PRESSURE: 72 MMHG | SYSTOLIC BLOOD PRESSURE: 110 MMHG | BODY MASS INDEX: 30.02 KG/M2 | WEIGHT: 159 LBS | HEIGHT: 61 IN

## 2023-08-30 DIAGNOSIS — N93.8 DUB (DYSFUNCTIONAL UTERINE BLEEDING): ICD-10-CM

## 2023-08-30 DIAGNOSIS — Z01.419 ENCOUNTER FOR WELL WOMAN EXAM WITH ROUTINE GYNECOLOGICAL EXAM: ICD-10-CM

## 2023-08-30 DIAGNOSIS — R76.8 ANA POSITIVE: ICD-10-CM

## 2023-08-30 DIAGNOSIS — Z11.3 SCREENING FOR STD (SEXUALLY TRANSMITTED DISEASE): ICD-10-CM

## 2023-08-30 DIAGNOSIS — Z01.419 ENCOUNTER FOR WELL WOMAN EXAM WITH ROUTINE GYNECOLOGICAL EXAM: Primary | ICD-10-CM

## 2023-08-30 PROCEDURE — 99395 PREV VISIT EST AGE 18-39: CPT | Performed by: OBSTETRICS & GYNECOLOGY

## 2023-08-30 NOTE — TELEPHONE ENCOUNTER
Pharmacy calling because rx is different from before and not being taken the same either, calling pt make sure the dosage is correct and SIG is correct, please advise

## 2023-08-31 LAB
CLUE CELLS VAG QL WET PREP: NORMAL
T VAGINALIS VAG QL WET PREP: NORMAL
TRICHOMONAS VAGINALIS SCREEN: NEGATIVE
YEAST VAG QL WET PREP: NORMAL

## 2023-09-04 ASSESSMENT — ENCOUNTER SYMPTOMS
VOMITING: 0
ABDOMINAL DISTENTION: 0
NAUSEA: 0
WHEEZING: 0
ABDOMINAL PAIN: 0
CONSTIPATION: 0
SHORTNESS OF BREATH: 0
COUGH: 0
SORE THROAT: 0
DIARRHEA: 0
BLOOD IN STOOL: 0

## 2023-09-05 LAB
C TRACH DNA CVX QL NAA+PROBE: NORMAL
N GONORRHOEA DNA CERV MUCUS QL NAA+PROBE: NORMAL

## 2023-11-22 ENCOUNTER — ANCILLARY PROCEDURE (OUTPATIENT)
Dept: RADIOLOGY | Facility: CLINIC | Age: 28
End: 2023-11-22
Payer: COMMERCIAL

## 2023-11-22 DIAGNOSIS — R10.31 ABDOMINAL PAIN, RIGHT LOWER QUADRANT: ICD-10-CM

## 2023-11-22 PROCEDURE — 76857 US EXAM PELVIC LIMITED: CPT | Performed by: RADIOLOGY

## 2023-11-22 PROCEDURE — 76705 ECHO EXAM OF ABDOMEN: CPT

## 2023-12-26 ENCOUNTER — TELEPHONE (OUTPATIENT)
Dept: PRIMARY CARE | Facility: CLINIC | Age: 28
End: 2023-12-26

## 2023-12-26 ENCOUNTER — OFFICE VISIT (OUTPATIENT)
Dept: PRIMARY CARE | Facility: CLINIC | Age: 28
End: 2023-12-26
Payer: COMMERCIAL

## 2023-12-26 VITALS
HEIGHT: 63 IN | TEMPERATURE: 98.2 F | OXYGEN SATURATION: 99 % | BODY MASS INDEX: 29.45 KG/M2 | DIASTOLIC BLOOD PRESSURE: 63 MMHG | WEIGHT: 166.2 LBS | RESPIRATION RATE: 16 BRPM | SYSTOLIC BLOOD PRESSURE: 103 MMHG | HEART RATE: 75 BPM

## 2023-12-26 DIAGNOSIS — R76.8 ELEVATED ANTINUCLEAR ANTIBODY (ANA) LEVEL: ICD-10-CM

## 2023-12-26 DIAGNOSIS — G43.709 CHRONIC MIGRAINE WITHOUT AURA WITHOUT STATUS MIGRAINOSUS, NOT INTRACTABLE: ICD-10-CM

## 2023-12-26 DIAGNOSIS — R10.11 RUQ ABDOMINAL PAIN: Primary | ICD-10-CM

## 2023-12-26 PROCEDURE — 99213 OFFICE O/P EST LOW 20 MIN: CPT | Performed by: FAMILY MEDICINE

## 2023-12-26 PROCEDURE — 1036F TOBACCO NON-USER: CPT | Performed by: FAMILY MEDICINE

## 2023-12-26 RX ORDER — SUMATRIPTAN SUCCINATE 100 MG/1
TABLET ORAL
Qty: 9 TABLET | Refills: 3 | Status: SHIPPED | OUTPATIENT
Start: 2023-12-26

## 2023-12-26 ASSESSMENT — ENCOUNTER SYMPTOMS
TREMORS: 0
EYE PAIN: 0
ABDOMINAL DISTENTION: 0
SEIZURES: 0
ADENOPATHY: 0
DIAPHORESIS: 0
HALLUCINATIONS: 0
POLYDIPSIA: 0
SHORTNESS OF BREATH: 0
BACK PAIN: 0
NAUSEA: 0
PHOTOPHOBIA: 0
FATIGUE: 0
HEADACHES: 0
FEVER: 0
SPEECH DIFFICULTY: 0
MYALGIAS: 0
EYE DISCHARGE: 0
NECK PAIN: 0
CHEST TIGHTNESS: 0
WHEEZING: 0
BLOOD IN STOOL: 0
NERVOUS/ANXIOUS: 0
NUMBNESS: 0
EYE REDNESS: 0
DIZZINESS: 0
UNEXPECTED WEIGHT CHANGE: 0
APPETITE CHANGE: 0
EYE ITCHING: 0
FREQUENCY: 0
VOMITING: 0
VOICE CHANGE: 0
SORE THROAT: 0
ARTHRALGIAS: 0
ABDOMINAL PAIN: 0
BRUISES/BLEEDS EASILY: 0
COUGH: 0
CONFUSION: 0
DYSPHORIC MOOD: 0
CHILLS: 0
CHOKING: 0
JOINT SWELLING: 0
LIGHT-HEADEDNESS: 0
NECK STIFFNESS: 0
RHINORRHEA: 0
DIARRHEA: 0
WOUND: 0
FACIAL ASYMMETRY: 0
FLANK PAIN: 0
TROUBLE SWALLOWING: 0
PALPITATIONS: 0
HEMATURIA: 0
WEAKNESS: 0
SLEEP DISTURBANCE: 0
DYSURIA: 0
AGITATION: 0
CONSTIPATION: 0
ACTIVITY CHANGE: 0
SINUS PRESSURE: 0

## 2023-12-26 ASSESSMENT — PATIENT HEALTH QUESTIONNAIRE - PHQ9
SUM OF ALL RESPONSES TO PHQ9 QUESTIONS 1 AND 2: 0
1. LITTLE INTEREST OR PLEASURE IN DOING THINGS: NOT AT ALL
2. FEELING DOWN, DEPRESSED OR HOPELESS: NOT AT ALL

## 2023-12-26 ASSESSMENT — ANXIETY QUESTIONNAIRES
4. TROUBLE RELAXING: NOT AT ALL
GAD7 TOTAL SCORE: 0
IF YOU CHECKED OFF ANY PROBLEMS ON THIS QUESTIONNAIRE, HOW DIFFICULT HAVE THESE PROBLEMS MADE IT FOR YOU TO DO YOUR WORK, TAKE CARE OF THINGS AT HOME, OR GET ALONG WITH OTHER PEOPLE: NOT DIFFICULT AT ALL
7. FEELING AFRAID AS IF SOMETHING AWFUL MIGHT HAPPEN: NOT AT ALL
6. BECOMING EASILY ANNOYED OR IRRITABLE: NOT AT ALL
1. FEELING NERVOUS, ANXIOUS, OR ON EDGE: NOT AT ALL
5. BEING SO RESTLESS THAT IT IS HARD TO SIT STILL: NOT AT ALL
3. WORRYING TOO MUCH ABOUT DIFFERENT THINGS: NOT AT ALL
2. NOT BEING ABLE TO STOP OR CONTROL WORRYING: NOT AT ALL

## 2023-12-26 NOTE — TELEPHONE ENCOUNTER
Patient states at checkout that TW was to place labs and an Ultrasound of the Gallbladder for the patient to schedule.     Please advise. Thank you.

## 2023-12-26 NOTE — PROGRESS NOTES
Subjective   Patient ID: Isabel Mead is a 28 y.o. female who presents for Labs Only (Review labs/), liver (Liver concerns been having pain on her right side of her abdominal comes and goes. Also both ears ringing, feeling fatigued lately and heart burn. Pt stated she has been craving raw onions for 2 weeks now and she never craves for onions. She is on birth control. ), and Dental Pain (She is looking for dentist to do her root canal and she wants to know if you could look at it. Its starting to hurt more pain today. ).  HPI    Review of Systems   Constitutional:  Negative for activity change, appetite change, chills, diaphoresis, fatigue, fever and unexpected weight change.   HENT:  Negative for congestion, ear pain, hearing loss, nosebleeds, postnasal drip, rhinorrhea, sinus pressure, sneezing, sore throat, tinnitus, trouble swallowing and voice change.    Eyes:  Negative for photophobia, pain, discharge, redness, itching and visual disturbance.   Respiratory:  Negative for cough, choking, chest tightness, shortness of breath and wheezing.    Cardiovascular:  Negative for chest pain, palpitations and leg swelling.   Gastrointestinal:  Negative for abdominal distention, abdominal pain, blood in stool, constipation, diarrhea, nausea and vomiting.   Endocrine: Negative for cold intolerance, heat intolerance, polydipsia and polyuria.   Genitourinary:  Negative for dysuria, flank pain, frequency, hematuria and urgency.   Musculoskeletal:  Negative for arthralgias, back pain, joint swelling, myalgias, neck pain and neck stiffness.   Skin:  Negative for rash and wound.   Allergic/Immunologic: Negative for immunocompromised state.   Neurological:  Negative for dizziness, tremors, seizures, syncope, facial asymmetry, speech difficulty, weakness, light-headedness, numbness and headaches.   Hematological:  Negative for adenopathy. Does not bruise/bleed easily.   Psychiatric/Behavioral:  Negative for agitation,  "behavioral problems, confusion, dysphoric mood, hallucinations, self-injury, sleep disturbance and suicidal ideas. The patient is not nervous/anxious.        Objective   /63 (BP Location: Left arm, Patient Position: Sitting, BP Cuff Size: Adult)   Pulse 75   Temp 36.8 °C (98.2 °F) (Temporal)   Resp 16   Ht 1.588 m (5' 2.5\")   Wt 75.4 kg (166 lb 3.2 oz)   SpO2 99%   BMI 29.91 kg/m²   Physical Exam  Constitutional:       General: She is not in acute distress.     Appearance: She is not ill-appearing or diaphoretic.   HENT:      Head: Normocephalic and atraumatic.      Right Ear: External ear normal.      Left Ear: External ear normal.      Nose: Nose normal. No rhinorrhea.   Eyes:      General: Lids are normal. No scleral icterus.        Right eye: No discharge.         Left eye: No discharge.      Conjunctiva/sclera: Conjunctivae normal.   Cardiovascular:      Rate and Rhythm: Normal rate and regular rhythm.      Pulses: Normal pulses.      Heart sounds: No murmur heard.  Pulmonary:      Effort: Pulmonary effort is normal. No respiratory distress.      Breath sounds: No decreased breath sounds, wheezing, rhonchi or rales.   Abdominal:      General: Bowel sounds are normal. There is no distension.      Palpations: Abdomen is soft. There is no mass.      Tenderness: There is no abdominal tenderness. There is no guarding or rebound.   Musculoskeletal:         General: No swelling, tenderness or deformity.      Cervical back: No rigidity or tenderness.      Right lower leg: No edema.      Left lower leg: No edema.   Lymphadenopathy:      Cervical: No cervical adenopathy.      Upper Body:      Right upper body: No supraclavicular adenopathy.      Left upper body: No supraclavicular adenopathy.   Skin:     General: Skin is warm and dry.      Coloration: Skin is not jaundiced or pale.      Findings: No erythema, lesion or rash.   Neurological:      General: No focal deficit present.      Mental Status: She is " alert and oriented to person, place, and time.      Sensory: No sensory deficit.      Motor: No weakness or tremor.      Coordination: Coordination normal.      Gait: Gait normal.   Psychiatric:         Mood and Affect: Mood normal. Affect is not inappropriate.         Behavior: Behavior normal.         Assessment/Plan   Problem List Items Addressed This Visit       Migraine, chronic, without aura    Relevant Medications    SUMAtriptan (Imitrex) 100 mg tablet     Other Visit Diagnoses       RUQ abdominal pain    -  Primary    Relevant Orders    Hepatitis A Antibody, Total    Hepatitis C Antibody    Hepatitis B Surface Antigen    Iron and TIBC    Alpha-1-Antitrypsin    Ammonia    Anti-Mitochondrial Antibody    CMV DNA, Quantitative, PCR    [place] Virus Antibody Panel    Comprehensive Metabolic Panel    Gamma-Glutamyl Transferase    Protime-INR    US gallbladder    HCG, quantitative, pregnancy    CBC and Auto Differential    Follow Up In [facility] - PCP - Established    Elevated antinuclear antibody (SHAWN) level

## 2023-12-29 ENCOUNTER — ANCILLARY PROCEDURE (OUTPATIENT)
Dept: RADIOLOGY | Facility: CLINIC | Age: 28
End: 2023-12-29
Payer: COMMERCIAL

## 2023-12-29 ENCOUNTER — LAB (OUTPATIENT)
Dept: LAB | Facility: LAB | Age: 28
End: 2023-12-29
Payer: COMMERCIAL

## 2023-12-29 DIAGNOSIS — R10.11 RUQ ABDOMINAL PAIN: ICD-10-CM

## 2023-12-29 DIAGNOSIS — R79.9 ABNORMAL BLOOD FINDINGS: ICD-10-CM

## 2023-12-29 LAB
A1AT SERPL NEPH-MCNC: 166 MG/DL (ref 84–218)
ALBUMIN SERPL BCP-MCNC: 4.9 G/DL (ref 3.4–5)
ALP SERPL-CCNC: 60 U/L (ref 33–110)
ALT SERPL W P-5'-P-CCNC: 23 U/L (ref 7–45)
AMMONIA PLAS-SCNC: 35 UMOL/L (ref 16–53)
ANION GAP SERPL CALC-SCNC: 13 MMOL/L (ref 10–20)
AST SERPL W P-5'-P-CCNC: 21 U/L (ref 9–39)
B-HCG SERPL-ACNC: <2 MIU/ML
BASOPHILS # BLD AUTO: 0.08 X10*3/UL (ref 0–0.1)
BASOPHILS NFR BLD AUTO: 1 %
BILIRUB SERPL-MCNC: 0.8 MG/DL (ref 0–1.2)
BUN SERPL-MCNC: 13 MG/DL (ref 6–23)
CALCIUM SERPL-MCNC: 9.6 MG/DL (ref 8.6–10.3)
CHLORIDE SERPL-SCNC: 102 MMOL/L (ref 98–107)
CO2 SERPL-SCNC: 27 MMOL/L (ref 21–32)
CREAT SERPL-MCNC: 0.66 MG/DL (ref 0.5–1.05)
EBV EA IGG SER QL: NEGATIVE
EBV NA AB SER QL: NEGATIVE
EBV VCA IGG SER IA-ACNC: NEGATIVE
EBV VCA IGM SER IA-ACNC: NORMAL
EOSINOPHIL # BLD AUTO: 0.23 X10*3/UL (ref 0–0.7)
EOSINOPHIL NFR BLD AUTO: 3 %
ERYTHROCYTE [DISTWIDTH] IN BLOOD BY AUTOMATED COUNT: 16.5 % (ref 11.5–14.5)
GFR SERPL CREATININE-BSD FRML MDRD: >90 ML/MIN/1.73M*2
GGT SERPL-CCNC: 15 U/L (ref 5–55)
GLUCOSE SERPL-MCNC: 69 MG/DL (ref 74–99)
HAV AB SER QL IA: REACTIVE
HBV SURFACE AG SERPL QL IA: NONREACTIVE
HCT VFR BLD AUTO: 40.2 % (ref 36–46)
HCV AB SER QL: NONREACTIVE
HGB BLD-MCNC: 12.5 G/DL (ref 12–16)
IMM GRANULOCYTES # BLD AUTO: 0.03 X10*3/UL (ref 0–0.7)
IMM GRANULOCYTES NFR BLD AUTO: 0.4 % (ref 0–0.9)
INR PPP: 1 (ref 0.9–1.1)
IRON SATN MFR SERPL: 27 % (ref 25–45)
IRON SERPL-MCNC: 114 UG/DL (ref 35–150)
LYMPHOCYTES # BLD AUTO: 2.43 X10*3/UL (ref 1.2–4.8)
LYMPHOCYTES NFR BLD AUTO: 31.6 %
MCH RBC QN AUTO: 20.4 PG (ref 26–34)
MCHC RBC AUTO-ENTMCNC: 31.1 G/DL (ref 32–36)
MCV RBC AUTO: 66 FL (ref 80–100)
MONOCYTES # BLD AUTO: 0.41 X10*3/UL (ref 0.1–1)
MONOCYTES NFR BLD AUTO: 5.3 %
NEUTROPHILS # BLD AUTO: 4.5 X10*3/UL (ref 1.2–7.7)
NEUTROPHILS NFR BLD AUTO: 58.7 %
NRBC BLD-RTO: 0 /100 WBCS (ref 0–0)
PLATELET # BLD AUTO: 411 X10*3/UL (ref 150–450)
POTASSIUM SERPL-SCNC: 4 MMOL/L (ref 3.5–5.3)
PROT SERPL-MCNC: 8.2 G/DL (ref 6.4–8.2)
PROTHROMBIN TIME: 11.2 SECONDS (ref 9.8–12.8)
RBC # BLD AUTO: 6.14 X10*6/UL (ref 4–5.2)
SODIUM SERPL-SCNC: 138 MMOL/L (ref 136–145)
TIBC SERPL-MCNC: 430 UG/DL (ref 240–445)
UIBC SERPL-MCNC: 316 UG/DL (ref 110–370)
WBC # BLD AUTO: 7.7 X10*3/UL (ref 4.4–11.3)

## 2023-12-29 PROCEDURE — 84702 CHORIONIC GONADOTROPIN TEST: CPT

## 2023-12-29 PROCEDURE — 85610 PROTHROMBIN TIME: CPT

## 2023-12-29 PROCEDURE — 86663 EPSTEIN-BARR ANTIBODY: CPT

## 2023-12-29 PROCEDURE — 76705 ECHO EXAM OF ABDOMEN: CPT

## 2023-12-29 PROCEDURE — 86803 HEPATITIS C AB TEST: CPT

## 2023-12-29 PROCEDURE — 83550 IRON BINDING TEST: CPT

## 2023-12-29 PROCEDURE — 86708 HEPATITIS A ANTIBODY: CPT

## 2023-12-29 PROCEDURE — 82977 ASSAY OF GGT: CPT

## 2023-12-29 PROCEDURE — 83540 ASSAY OF IRON: CPT

## 2023-12-29 PROCEDURE — 76705 ECHO EXAM OF ABDOMEN: CPT | Performed by: RADIOLOGY

## 2023-12-29 PROCEDURE — 86665 EPSTEIN-BARR CAPSID VCA: CPT

## 2023-12-29 PROCEDURE — 85025 COMPLETE CBC W/AUTO DIFF WBC: CPT

## 2023-12-29 PROCEDURE — 87340 HEPATITIS B SURFACE AG IA: CPT

## 2023-12-29 PROCEDURE — 82140 ASSAY OF AMMONIA: CPT

## 2023-12-29 PROCEDURE — 36415 COLL VENOUS BLD VENIPUNCTURE: CPT

## 2023-12-29 PROCEDURE — 82103 ALPHA-1-ANTITRYPSIN TOTAL: CPT

## 2023-12-29 PROCEDURE — 86381 MITOCHONDRIAL ANTIBODY EACH: CPT

## 2023-12-29 PROCEDURE — 80053 COMPREHEN METABOLIC PANEL: CPT

## 2023-12-29 PROCEDURE — 86664 EPSTEIN-BARR NUCLEAR ANTIGEN: CPT

## 2023-12-31 LAB
CMV DNA SERPL NAA+PROBE-LOG IU: NORMAL {LOG_IU}/ML
LABORATORY COMMENT REPORT: NOT DETECTED

## 2024-01-01 LAB — EBV VCA IGM SER-ACNC: <10 U/ML (ref 0–43.9)

## 2024-01-02 LAB — MITOCHONDRIA AB SER QL IF: NEGATIVE

## 2024-01-03 ENCOUNTER — OFFICE VISIT (OUTPATIENT)
Dept: PRIMARY CARE | Facility: CLINIC | Age: 29
End: 2024-01-03
Payer: COMMERCIAL

## 2024-01-03 VITALS
TEMPERATURE: 97.7 F | RESPIRATION RATE: 16 BRPM | OXYGEN SATURATION: 100 % | BODY MASS INDEX: 29.23 KG/M2 | SYSTOLIC BLOOD PRESSURE: 119 MMHG | HEIGHT: 63 IN | DIASTOLIC BLOOD PRESSURE: 72 MMHG | HEART RATE: 85 BPM | WEIGHT: 165 LBS

## 2024-01-03 DIAGNOSIS — K76.9 LIVER LESION, LEFT LOBE: Primary | ICD-10-CM

## 2024-01-03 DIAGNOSIS — G89.29 CHRONIC RUQ PAIN: ICD-10-CM

## 2024-01-03 DIAGNOSIS — R10.11 CHRONIC RUQ PAIN: ICD-10-CM

## 2024-01-03 PROCEDURE — 99213 OFFICE O/P EST LOW 20 MIN: CPT | Performed by: FAMILY MEDICINE

## 2024-01-03 PROCEDURE — 1036F TOBACCO NON-USER: CPT | Performed by: FAMILY MEDICINE

## 2024-01-03 ASSESSMENT — ENCOUNTER SYMPTOMS
LIGHT-HEADEDNESS: 0
NUMBNESS: 0
FREQUENCY: 0
NECK STIFFNESS: 0
WEAKNESS: 0
ACTIVITY CHANGE: 0
FEVER: 0
BRUISES/BLEEDS EASILY: 0
ADENOPATHY: 0
UNEXPECTED WEIGHT CHANGE: 0
NERVOUS/ANXIOUS: 0
SORE THROAT: 0
EYE PAIN: 0
DIAPHORESIS: 0
TREMORS: 0
EYE REDNESS: 0
VOICE CHANGE: 0
SHORTNESS OF BREATH: 0
AGITATION: 0
RHINORRHEA: 0
ARTHRALGIAS: 0
DIARRHEA: 0
FATIGUE: 0
FLANK PAIN: 0
ABDOMINAL PAIN: 0
CHOKING: 0
EYE DISCHARGE: 0
MYALGIAS: 0
CHEST TIGHTNESS: 0
CONFUSION: 0
DIZZINESS: 0
FACIAL ASYMMETRY: 0
EYE ITCHING: 0
DYSURIA: 0
NAUSEA: 0
NECK PAIN: 0
APPETITE CHANGE: 0
WOUND: 0
JOINT SWELLING: 0
BLOOD IN STOOL: 0
ABDOMINAL DISTENTION: 0
VOMITING: 0
CHILLS: 0
WHEEZING: 0
SLEEP DISTURBANCE: 0
HEADACHES: 0
POLYDIPSIA: 0
COUGH: 0
CONSTIPATION: 0
BACK PAIN: 0
PHOTOPHOBIA: 0
TROUBLE SWALLOWING: 0
DYSPHORIC MOOD: 0
PALPITATIONS: 0
HALLUCINATIONS: 0
HEMATURIA: 0
SEIZURES: 0
SPEECH DIFFICULTY: 0
SINUS PRESSURE: 0

## 2024-01-03 ASSESSMENT — PATIENT HEALTH QUESTIONNAIRE - PHQ9
2. FEELING DOWN, DEPRESSED OR HOPELESS: NOT AT ALL
1. LITTLE INTEREST OR PLEASURE IN DOING THINGS: NOT AT ALL
SUM OF ALL RESPONSES TO PHQ9 QUESTIONS 1 AND 2: 0

## 2024-01-03 ASSESSMENT — ANXIETY QUESTIONNAIRES
IF YOU CHECKED OFF ANY PROBLEMS ON THIS QUESTIONNAIRE, HOW DIFFICULT HAVE THESE PROBLEMS MADE IT FOR YOU TO DO YOUR WORK, TAKE CARE OF THINGS AT HOME, OR GET ALONG WITH OTHER PEOPLE: NOT DIFFICULT AT ALL
4. TROUBLE RELAXING: NOT AT ALL
GAD7 TOTAL SCORE: 0
3. WORRYING TOO MUCH ABOUT DIFFERENT THINGS: NOT AT ALL
6. BECOMING EASILY ANNOYED OR IRRITABLE: NOT AT ALL
1. FEELING NERVOUS, ANXIOUS, OR ON EDGE: NOT AT ALL
2. NOT BEING ABLE TO STOP OR CONTROL WORRYING: NOT AT ALL
7. FEELING AFRAID AS IF SOMETHING AWFUL MIGHT HAPPEN: NOT AT ALL
5. BEING SO RESTLESS THAT IT IS HARD TO SIT STILL: NOT AT ALL

## 2024-01-03 NOTE — PROGRESS NOTES
Subjective   Patient ID: Isabel Mead is a 28 y.o. female who presents for Follow-up (Dr. Cantu pt. Here today to follow up on labs and US ).  Patient comes to the office today for follow-up on her right upper quadrant discomfort.  She denies any issues with nausea, vomiting, diarrhea, fever, chills, shortness of breath or chest pain she states that symptomatology is essentially unchanged..  She is coming in to review imaging and labs.        Review of Systems   Constitutional:  Negative for activity change, appetite change, chills, diaphoresis, fatigue, fever and unexpected weight change.   HENT:  Negative for congestion, ear pain, hearing loss, nosebleeds, postnasal drip, rhinorrhea, sinus pressure, sneezing, sore throat, tinnitus, trouble swallowing and voice change.    Eyes:  Negative for photophobia, pain, discharge, redness, itching and visual disturbance.   Respiratory:  Negative for cough, choking, chest tightness, shortness of breath and wheezing.    Cardiovascular:  Negative for chest pain, palpitations and leg swelling.   Gastrointestinal:  Negative for abdominal distention, abdominal pain, blood in stool, constipation, diarrhea, nausea and vomiting.   Endocrine: Negative for cold intolerance, heat intolerance, polydipsia and polyuria.   Genitourinary:  Negative for dysuria, flank pain, frequency, hematuria and urgency.   Musculoskeletal:  Negative for arthralgias, back pain, joint swelling, myalgias, neck pain and neck stiffness.   Skin:  Negative for rash and wound.   Allergic/Immunologic: Negative for immunocompromised state.   Neurological:  Negative for dizziness, tremors, seizures, syncope, facial asymmetry, speech difficulty, weakness, light-headedness, numbness and headaches.   Hematological:  Negative for adenopathy. Does not bruise/bleed easily.   Psychiatric/Behavioral:  Negative for agitation, behavioral problems, confusion, dysphoric mood, hallucinations, self-injury, sleep  "disturbance and suicidal ideas. The patient is not nervous/anxious.        Objective   /72 (BP Location: Left arm, Patient Position: Sitting, BP Cuff Size: Large adult)   Pulse 85   Temp 36.5 °C (97.7 °F) (Temporal)   Resp 16   Ht 1.588 m (5' 2.5\")   Wt 74.8 kg (165 lb)   SpO2 100%   BMI 29.70 kg/m²   Physical Exam  Constitutional:       General: She is not in acute distress.     Appearance: She is not ill-appearing or diaphoretic.   HENT:      Head: Normocephalic and atraumatic.      Right Ear: External ear normal.      Left Ear: External ear normal.      Nose: Nose normal. No rhinorrhea.   Eyes:      General: Lids are normal. No scleral icterus.        Right eye: No discharge.         Left eye: No discharge.      Conjunctiva/sclera: Conjunctivae normal.   Cardiovascular:      Rate and Rhythm: Normal rate and regular rhythm.      Pulses: Normal pulses.      Heart sounds: No murmur heard.  Pulmonary:      Effort: Pulmonary effort is normal. No respiratory distress.      Breath sounds: No decreased breath sounds, wheezing, rhonchi or rales.   Abdominal:      General: Bowel sounds are normal. There is no distension.      Palpations: Abdomen is soft. There is no mass.      Tenderness: There is no abdominal tenderness. There is no guarding or rebound.   Musculoskeletal:         General: No swelling, tenderness or deformity.      Cervical back: No rigidity or tenderness.      Right lower leg: No edema.      Left lower leg: No edema.   Lymphadenopathy:      Cervical: No cervical adenopathy.      Upper Body:      Right upper body: No supraclavicular adenopathy.      Left upper body: No supraclavicular adenopathy.   Skin:     General: Skin is warm and dry.      Coloration: Skin is not jaundiced or pale.      Findings: No erythema, lesion or rash.   Neurological:      General: No focal deficit present.      Mental Status: She is alert and oriented to person, place, and time.      Sensory: No sensory deficit.     "  Motor: No weakness or tremor.      Coordination: Coordination normal.      Gait: Gait normal.   Psychiatric:         Mood and Affect: Mood normal. Affect is not inappropriate.         Behavior: Behavior normal.         Assessment/Plan   Problem List Items Addressed This Visit       Chronic RUQ pain     Other Visit Diagnoses       Liver lesion, left lobe    -  Primary    Relevant Orders    MR liver w and wo IV contrast        Labs and imaging reviewed with patient.  Of note hepatitis A antibody positive But patient has had hepatitis a vaccine previously and findings are consistent with prior hep A vaccine.  Blood count shows normal H&H but hypochromic macrocytic indices consistent with her prior thalassemia diagnosis.  Labs are essentially otherwise noncontributory.  Ultrasound noted mild hepatomegaly but indeterminate hypoechoic mass in the left lobe of the liver With recommendation for follow-up MRI.  Reviewed with patient this is likely hemangioma but we will check MRI as recommended by radiology.  Otherwise I would like her to keep next regular scheduled follow-up appointment with her PCP Dr. Cantu.

## 2024-01-19 ENCOUNTER — APPOINTMENT (OUTPATIENT)
Dept: RADIOLOGY | Facility: CLINIC | Age: 29
End: 2024-01-19
Payer: COMMERCIAL

## 2024-01-31 ENCOUNTER — HOSPITAL ENCOUNTER (OUTPATIENT)
Dept: RADIOLOGY | Facility: CLINIC | Age: 29
Discharge: HOME | End: 2024-01-31
Payer: COMMERCIAL

## 2024-01-31 DIAGNOSIS — K76.9 LIVER LESION, LEFT LOBE: ICD-10-CM

## 2024-01-31 PROCEDURE — 74183 MRI ABD W/O CNTR FLWD CNTR: CPT | Performed by: STUDENT IN AN ORGANIZED HEALTH CARE EDUCATION/TRAINING PROGRAM

## 2024-01-31 PROCEDURE — 2550000001 HC RX 255 CONTRASTS: Performed by: FAMILY MEDICINE

## 2024-01-31 PROCEDURE — A9575 INJ GADOTERATE MEGLUMI 0.1ML: HCPCS | Performed by: FAMILY MEDICINE

## 2024-01-31 PROCEDURE — 74183 MRI ABD W/O CNTR FLWD CNTR: CPT

## 2024-01-31 RX ORDER — GADOTERATE MEGLUMINE 376.9 MG/ML
15 INJECTION INTRAVENOUS
Status: COMPLETED | OUTPATIENT
Start: 2024-01-31 | End: 2024-01-31

## 2024-01-31 RX ADMIN — GADOTERATE MEGLUMINE 15 ML: 376.9 INJECTION INTRAVENOUS at 13:37

## 2024-02-01 DIAGNOSIS — K76.9 LIVER LESION, LEFT LOBE: ICD-10-CM

## 2024-02-01 DIAGNOSIS — K76.9 LIVER LESION, LEFT LOBE: Primary | ICD-10-CM

## 2024-02-05 ENCOUNTER — TELEPHONE (OUTPATIENT)
Dept: PRIMARY CARE | Facility: CLINIC | Age: 29
End: 2024-02-05
Payer: COMMERCIAL

## 2024-07-15 DIAGNOSIS — E28.2 PCOS (POLYCYSTIC OVARIAN SYNDROME): ICD-10-CM

## 2024-07-15 RX ORDER — NORGESTIMATE AND ETHINYL ESTRADIOL 0.25-0.035
1 KIT ORAL DAILY
Qty: 28 TABLET | Refills: 1 | Status: SHIPPED | OUTPATIENT
Start: 2024-07-15

## 2024-07-31 ENCOUNTER — TELEPHONE (OUTPATIENT)
Dept: PRIMARY CARE | Facility: CLINIC | Age: 29
End: 2024-07-31
Payer: COMMERCIAL

## 2024-08-01 ENCOUNTER — PATIENT MESSAGE (OUTPATIENT)
Dept: PRIMARY CARE | Facility: CLINIC | Age: 29
End: 2024-08-01
Payer: COMMERCIAL

## 2024-08-01 DIAGNOSIS — E28.2 PCOS (POLYCYSTIC OVARIAN SYNDROME): ICD-10-CM

## 2024-08-02 RX ORDER — NORGESTIMATE AND ETHINYL ESTRADIOL 7DAYSX3 LO
1 KIT ORAL DAILY
Qty: 84 TABLET | Refills: 3 | Status: SHIPPED | OUTPATIENT
Start: 2024-08-02 | End: 2025-08-02

## 2025-05-20 ENCOUNTER — APPOINTMENT (OUTPATIENT)
Dept: PRIMARY CARE | Facility: CLINIC | Age: 30
End: 2025-05-20
Payer: COMMERCIAL

## 2025-05-20 VITALS
RESPIRATION RATE: 16 BRPM | WEIGHT: 180 LBS | SYSTOLIC BLOOD PRESSURE: 100 MMHG | HEART RATE: 80 BPM | DIASTOLIC BLOOD PRESSURE: 66 MMHG | TEMPERATURE: 97.2 F | HEIGHT: 61 IN | OXYGEN SATURATION: 98 % | BODY MASS INDEX: 33.99 KG/M2

## 2025-05-20 DIAGNOSIS — F41.9 ANXIETY DISORDER, UNSPECIFIED TYPE: ICD-10-CM

## 2025-05-20 DIAGNOSIS — R10.9 ABDOMINAL CRAMPING: ICD-10-CM

## 2025-05-20 DIAGNOSIS — M47.816 OSTEOARTHRITIS OF LUMBAR SPINE, UNSPECIFIED SPINAL OSTEOARTHRITIS COMPLICATION STATUS: ICD-10-CM

## 2025-05-20 DIAGNOSIS — R10.2 PELVIC PAIN: ICD-10-CM

## 2025-05-20 DIAGNOSIS — G43.709 CHRONIC MIGRAINE WITHOUT AURA WITHOUT STATUS MIGRAINOSUS, NOT INTRACTABLE: ICD-10-CM

## 2025-05-20 DIAGNOSIS — E78.2 MIXED HYPERLIPIDEMIA: ICD-10-CM

## 2025-05-20 DIAGNOSIS — M25.50 ARTHRALGIA, UNSPECIFIED JOINT: ICD-10-CM

## 2025-05-20 DIAGNOSIS — M79.7 FIBROMYALGIA: ICD-10-CM

## 2025-05-20 DIAGNOSIS — R76.8 POSITIVE ANA (ANTINUCLEAR ANTIBODY): ICD-10-CM

## 2025-05-20 DIAGNOSIS — K76.9 LIVER LESION: Primary | ICD-10-CM

## 2025-05-20 DIAGNOSIS — E28.2 PCOS (POLYCYSTIC OVARIAN SYNDROME): ICD-10-CM

## 2025-05-20 DIAGNOSIS — R10.2 FEMALE PELVIC PAIN: ICD-10-CM

## 2025-05-20 LAB
POC APPEARANCE, URINE: CLEAR
POC BILIRUBIN, URINE: NEGATIVE
POC BLOOD, URINE: NEGATIVE
POC COLOR, URINE: YELLOW
POC GLUCOSE, URINE: NEGATIVE MG/DL
POC KETONES, URINE: NEGATIVE MG/DL
POC LEUKOCYTES, URINE: ABNORMAL
POC NITRITE,URINE: NEGATIVE
POC PH, URINE: 7 PH
POC PROTEIN, URINE: NEGATIVE MG/DL
POC SPECIFIC GRAVITY, URINE: 1.01
POC UROBILINOGEN, URINE: 0.2 EU/DL

## 2025-05-20 PROCEDURE — 99214 OFFICE O/P EST MOD 30 MIN: CPT | Performed by: FAMILY MEDICINE

## 2025-05-20 PROCEDURE — 1036F TOBACCO NON-USER: CPT | Performed by: FAMILY MEDICINE

## 2025-05-20 PROCEDURE — 81003 URINALYSIS AUTO W/O SCOPE: CPT | Performed by: FAMILY MEDICINE

## 2025-05-20 PROCEDURE — 3008F BODY MASS INDEX DOCD: CPT | Performed by: FAMILY MEDICINE

## 2025-05-20 RX ORDER — SUMATRIPTAN SUCCINATE 100 MG/1
TABLET ORAL
Qty: 9 TABLET | Refills: 3 | Status: SHIPPED | OUTPATIENT
Start: 2025-05-20

## 2025-05-20 RX ORDER — NORGESTIMATE AND ETHINYL ESTRADIOL 7DAYSX3 LO
1 KIT ORAL DAILY
Qty: 84 TABLET | Refills: 3 | Status: SHIPPED | OUTPATIENT
Start: 2025-05-20 | End: 2026-05-20

## 2025-05-20 NOTE — PROGRESS NOTES
"Subjective   Patient ID: Isabel Mead is a 30 y.o. female who presents for   Chief Complaint   Patient presents with    Abdominal Cramping    Back Pain    Liver Lesion     Discuss MRI   Covid vax: declined  Flu: declined     Pap: 8/2023-gyn  Lmp: 1 week ago   pt is aware she was supposed to get mri but was worried financially and has no known issues or sxs    2wks ago had pelvic pain post sex with severe pain 20min    Occ r sided pelvic pain  Ua ok  Pain down into thighs and legs bilat  Reviewed mri spine  Saw rheumatology  Advised spine is aging a bit early  Multiple joint aches hitesh in am  Had seen rheum in past-advsied to follow up    HPI  Problem List[1]    Surgical History[2]    Review of Systems  This patient has  NO history of seizures/ CAD or CVA    NO history of recent Covid nor flu symptoms,    NO Fever nor chills today,    NO Chest pain, shortness of breath nor paroxysmal nocturnal dyspnea,  NO Nausea, vomiting, nor diarrhea,  NO Hematochezia nor melena,  NO Dysuria, hematuria, nor new incontinence issues  NO new severe headaches nor neurological complaints,  NO new issues with anxiety nor depression nor new psychiatric complaints,  NO suicidal nor homicidal ideations.   For migraines imitrex helps    OBJECTIVE:  /66   Pulse 80   Temp 36.2 °C (97.2 °F) (Temporal)   Resp 16   Ht 1.549 m (5' 1\")   Wt 81.6 kg (180 lb)   LMP 05/13/2025 (Approximate)   SpO2 98%   BMI 34.01 kg/m²      General:  alert, oriented, no acute distress.  No obvious skin rashes noted.   No gait disturbance noted/baseline gait.    Mood is pleasant,  no signs of emotional distress.   Not appearing intoxicated or altered.   No voiced delusions,   Normal, appropriate behavior.    HEENT: Normocephalic, atraumatic,   Pupils round, reactive to light  Extraocular motions intact and wnl  Tympanic membranes normal    Neck: no nuchal rigidity  No masses palpable.  No carotid bruits.  No thyromegaly.    Respiratory: Equal breath " sounds  No wheezes,    rales,    nor rhonchi  No respiratory distress.    Heart: Regular rate and rhythm, no    murmurs  no rubs/gallops    Abdomen: no masses palpable, nontender, no rebound nor guarding.  ovwt  Extremities: NO cyanosis noted, no clubbing.   No edema noted.  2+dorsalis pedis pulses.    Normal-not antalgic, steady gait.    Office Visit on 05/20/2025   Component Date Value Ref Range Status    POC Color, Urine 05/20/2025 Yellow  Straw, Yellow, Light-Yellow Final    POC Appearance, Urine 05/20/2025 Clear  Clear Final    POC Glucose, Urine 05/20/2025 NEGATIVE  NEGATIVE mg/dl Final    POC Bilirubin, Urine 05/20/2025 NEGATIVE  NEGATIVE Final    POC Ketones, Urine 05/20/2025 NEGATIVE  NEGATIVE mg/dl Final    POC Specific Gravity, Urine 05/20/2025 1.010  1.005 - 1.035 Final    POC Blood, Urine 05/20/2025 NEGATIVE  NEGATIVE Final    POC PH, Urine 05/20/2025 7.0  No Reference Range Established PH Final    POC Protein, Urine 05/20/2025 NEGATIVE  NEGATIVE mg/dl Final    POC Urobilinogen, Urine 05/20/2025 0.2  0.2, 1.0 EU/DL Final    Poc Nitrite, Urine 05/20/2025 NEGATIVE  NEGATIVE Final    POC Leukocytes, Urine 05/20/2025 TRACE (A)  NEGATIVE Final        Assessment/Plan     Problem List Items Addressed This Visit       Anxiety disorder    Relevant Orders    CBC and Auto Differential    Comprehensive Metabolic Panel    Hemoglobin A1C    Lipid Panel    SHAWN    Sedimentation Rate    TSH    T4, free    Citrulline Antibody, IgG    Fibromyalgia    Relevant Orders    Referral to Rheumatology    CBC and Auto Differential    Comprehensive Metabolic Panel    Hemoglobin A1C    Lipid Panel    SHAWN    Sedimentation Rate    TSH    T4, free    Citrulline Antibody, IgG    Female pelvic pain    Relevant Orders    CBC and Auto Differential    Comprehensive Metabolic Panel    Hemoglobin A1C    Lipid Panel    SHAWN    Sedimentation Rate    TSH    T4, free    Citrulline Antibody, IgG    Migraine, chronic, without aura    Relevant  Medications    SUMAtriptan (Imitrex) 100 mg tablet    Other Relevant Orders    CBC and Auto Differential    Comprehensive Metabolic Panel    Hemoglobin A1C    Lipid Panel    SHAWN    Sedimentation Rate    TSH    T4, free    Citrulline Antibody, IgG    Mixed hyperlipidemia    Relevant Orders    CBC and Auto Differential    Comprehensive Metabolic Panel    Hemoglobin A1C    Lipid Panel    SHAWN    Sedimentation Rate    TSH    T4, free    Citrulline Antibody, IgG    PCOS (polycystic ovarian syndrome)    Relevant Medications    norgestimate-ethinyl estradioL (Tri-Lo-Estarylla) 0.18/0.215/0.25 mg-0.025 mg tablet    Other Relevant Orders    CBC and Auto Differential    Comprehensive Metabolic Panel    Hemoglobin A1C    Lipid Panel    SHAWN    Sedimentation Rate    TSH    T4, free    Citrulline Antibody, IgG     Other Visit Diagnoses         Liver lesion    -  Primary    Relevant Orders    US abdomen limited liver    CBC and Auto Differential    Comprehensive Metabolic Panel    Hemoglobin A1C    Lipid Panel    SHAWN    Sedimentation Rate    TSH    T4, free    Citrulline Antibody, IgG      Abdominal cramping        Relevant Orders    POCT UA Automated manually resulted (Completed)    CBC and Auto Differential    Comprehensive Metabolic Panel    Hemoglobin A1C    Lipid Panel    SHAWN    Sedimentation Rate    TSH    T4, free    Citrulline Antibody, IgG      Pelvic pain        Relevant Orders    US pelvis    CBC and Auto Differential    Comprehensive Metabolic Panel    Hemoglobin A1C    Lipid Panel    SHAWN    Sedimentation Rate    TSH    T4, free    Citrulline Antibody, IgG      Osteoarthritis of lumbar spine, unspecified spinal osteoarthritis complication status        Relevant Orders    Referral to Rheumatology    CBC and Auto Differential    Comprehensive Metabolic Panel    Hemoglobin A1C    Lipid Panel    SHAWN    Sedimentation Rate    TSH    T4, free    Citrulline Antibody, IgG      Positive SHAWN (antinuclear antibody)        Relevant  Orders    Referral to Rheumatology    CBC and Auto Differential    Comprehensive Metabolic Panel    Hemoglobin A1C    Lipid Panel    SHAWN    Sedimentation Rate    TSH    T4, free    Citrulline Antibody, IgG      Arthralgia, unspecified joint        Relevant Orders    CBC and Auto Differential    Comprehensive Metabolic Panel    Hemoglobin A1C    Lipid Panel    SHAWN    Sedimentation Rate    TSH    T4, free    Citrulline Antibody, IgG            Isabel Mead -be aware that any referrals discussed should be placed today or tests/labs ordered should result in prompt scheduling today.   If not done today-then a phone call for scheduling is expected in a timely manner(within 2 weeks).   If testing is to be done-a result should be available to patient within 2 weeks time unless otherwise specified.   You, the patient or caregiver, are responsible for making sure what was discussed is actually scheduled and completed.  If suboptimal understanding of results of tests or referral reason-a follow up appointment with me should be made.  If above does NOT occur-you are to connect with us for an explanation.  See me 3-6mo    Follow up at next scheduled visit -as planned or directed today.  Sooner if new or unresolved issues of concern.    Isabel Mead We know you have a choice for your health care, THANK YOU for choosing  and [facility].  We APPRECIATE YOU.  Sincerely,   Jasmine Cantu MD   (dr. Clarke)             [1]   Patient Active Problem List  Diagnosis    Adult lactase deficiency    Acne    Anemia    Anxiety disorder    Situational anxiety    Bulging lumbar disc    Chronic back pain    Fibromyalgia    Chronic RUQ pain    Dysphagia, idiopathic    Fatigue    Female pelvic pain    Frequent UTI    Recurrent candidiasis of vagina    GERD (gastroesophageal reflux disease)    H/O thalassemia minor    Heart palpitations    Abnormal thyroid function test    Hypertrophy of both inferior nasal turbinates    Leg  length discrepancy    Lumbar radiculopathy    Migraine, chronic, without aura    Mixed hyperlipidemia    Nasal septal deviation    Overweight (BMI 25.0-29.9)    Panic attack as reaction to stress    PCOS (polycystic ovarian syndrome)    Sciatica of left side associated with disorder of lumbar spine    PMS (premenstrual syndrome)    UTI symptoms    Vitamin B12 deficiency    Vitamin D deficiency    Class 1 obesity due to excess calories with body mass index (BMI) of 30.0 to 30.9 in adult    Goiter    Dizziness   [2]   Past Surgical History:  Procedure Laterality Date    ESOPHAGOGASTRODUODENOSCOPY  2018    TONSILLECTOMY  2008    WISDOM TOOTH EXTRACTION  2014

## 2025-05-21 ENCOUNTER — HOSPITAL ENCOUNTER (OUTPATIENT)
Dept: RADIOLOGY | Facility: HOSPITAL | Age: 30
Discharge: HOME | End: 2025-05-21
Payer: COMMERCIAL

## 2025-05-21 DIAGNOSIS — K76.9 LIVER LESION: ICD-10-CM

## 2025-05-21 DIAGNOSIS — R10.2 PELVIC PAIN: ICD-10-CM

## 2025-05-21 PROCEDURE — 76705 ECHO EXAM OF ABDOMEN: CPT

## 2025-05-21 PROCEDURE — 76856 US EXAM PELVIC COMPLETE: CPT

## 2025-05-21 PROCEDURE — 76856 US EXAM PELVIC COMPLETE: CPT | Performed by: STUDENT IN AN ORGANIZED HEALTH CARE EDUCATION/TRAINING PROGRAM

## 2025-05-21 PROCEDURE — 76705 ECHO EXAM OF ABDOMEN: CPT | Performed by: RADIOLOGY

## 2025-05-21 PROCEDURE — 76830 TRANSVAGINAL US NON-OB: CPT | Performed by: STUDENT IN AN ORGANIZED HEALTH CARE EDUCATION/TRAINING PROGRAM

## 2025-05-22 LAB — RHEUMATOID FACT SERPL-ACNC: <10 IU/ML

## 2025-05-23 LAB
ALBUMIN SERPL-MCNC: 4.5 G/DL (ref 3.6–5.1)
ALP SERPL-CCNC: 68 U/L (ref 31–125)
ALT SERPL-CCNC: 17 U/L (ref 6–29)
ANA PAT SER IF-IMP: ABNORMAL
ANA SER QL IF: POSITIVE
ANA TITR SER IF: ABNORMAL TITER
ANION GAP SERPL CALCULATED.4IONS-SCNC: 9 MMOL/L (CALC) (ref 7–17)
AST SERPL-CCNC: 15 U/L (ref 10–30)
BASOPHILS # BLD AUTO: 106 CELLS/UL (ref 0–200)
BASOPHILS NFR BLD AUTO: 1.1 %
BILIRUB SERPL-MCNC: 1 MG/DL (ref 0.2–1.2)
BUN SERPL-MCNC: 12 MG/DL (ref 7–25)
CALCIUM SERPL-MCNC: 9.5 MG/DL (ref 8.6–10.2)
CCP IGG SERPL-ACNC: <16 UNITS
CHLORIDE SERPL-SCNC: 100 MMOL/L (ref 98–110)
CHOLEST SERPL-MCNC: 228 MG/DL
CHOLEST/HDLC SERPL: 4.6 (CALC)
CO2 SERPL-SCNC: 27 MMOL/L (ref 20–32)
CREAT SERPL-MCNC: 0.67 MG/DL (ref 0.5–0.96)
EGFRCR SERPLBLD CKD-EPI 2021: 122 ML/MIN/1.73M2
EOSINOPHIL # BLD AUTO: 269 CELLS/UL (ref 15–500)
EOSINOPHIL NFR BLD AUTO: 2.8 %
ERYTHROCYTE [DISTWIDTH] IN BLOOD BY AUTOMATED COUNT: 15.7 % (ref 11–15)
ERYTHROCYTE [SEDIMENTATION RATE] IN BLOOD BY WESTERGREN METHOD: 17 MM/H
EST. AVERAGE GLUCOSE BLD GHB EST-MCNC: 100 MG/DL
EST. AVERAGE GLUCOSE BLD GHB EST-SCNC: 5.5 MMOL/L
GLUCOSE SERPL-MCNC: 81 MG/DL (ref 65–99)
HBA1C MFR BLD: 5.1 %
HCT VFR BLD AUTO: 38.5 % (ref 35–45)
HDLC SERPL-MCNC: 50 MG/DL
HGB BLD-MCNC: 11.4 G/DL (ref 11.7–15.5)
LDLC SERPL CALC-MCNC: 142 MG/DL (CALC)
LYMPHOCYTES # BLD AUTO: 2755 CELLS/UL (ref 850–3900)
LYMPHOCYTES NFR BLD AUTO: 28.7 %
MCH RBC QN AUTO: 19.9 PG (ref 27–33)
MCHC RBC AUTO-ENTMCNC: 29.6 G/DL (ref 32–36)
MCV RBC AUTO: 67.3 FL (ref 80–100)
MONOCYTES # BLD AUTO: 595 CELLS/UL (ref 200–950)
MONOCYTES NFR BLD AUTO: 6.2 %
MORPHOLOGY BLD-IMP: ABNORMAL
NEUTROPHILS # BLD AUTO: 5875 CELLS/UL (ref 1500–7800)
NEUTROPHILS NFR BLD AUTO: 61.2 %
NONHDLC SERPL-MCNC: 178 MG/DL (CALC)
PLATELET # BLD AUTO: 418 THOUSAND/UL (ref 140–400)
PMV BLD REES-ECKER: 10.8 FL (ref 7.5–12.5)
POTASSIUM SERPL-SCNC: 4 MMOL/L (ref 3.5–5.3)
PROT SERPL-MCNC: 7.5 G/DL (ref 6.1–8.1)
RBC # BLD AUTO: 5.72 MILLION/UL (ref 3.8–5.1)
SERVICE CMNT-IMP: ABNORMAL
SODIUM SERPL-SCNC: 136 MMOL/L (ref 135–146)
T4 FREE SERPL-MCNC: 1.2 NG/DL (ref 0.8–1.8)
TRIGL SERPL-MCNC: 219 MG/DL
TSH SERPL-ACNC: 2.28 MIU/L
WBC # BLD AUTO: 9.6 THOUSAND/UL (ref 3.8–10.8)

## 2025-05-28 DIAGNOSIS — R76.8 POSITIVE ANA (ANTINUCLEAR ANTIBODY): ICD-10-CM

## 2025-05-28 DIAGNOSIS — K76.9 LIVER LESION: ICD-10-CM

## 2025-08-05 ENCOUNTER — APPOINTMENT (OUTPATIENT)
Dept: RHEUMATOLOGY | Facility: CLINIC | Age: 30
End: 2025-08-05
Payer: COMMERCIAL

## 2025-08-05 VITALS
BODY MASS INDEX: 32.66 KG/M2 | WEIGHT: 173 LBS | DIASTOLIC BLOOD PRESSURE: 72 MMHG | HEIGHT: 61 IN | HEART RATE: 73 BPM | SYSTOLIC BLOOD PRESSURE: 109 MMHG

## 2025-08-05 DIAGNOSIS — R76.8 POSITIVE ANA (ANTINUCLEAR ANTIBODY): Primary | ICD-10-CM

## 2025-08-05 DIAGNOSIS — M54.50 MIDLINE LOW BACK PAIN, UNSPECIFIED CHRONICITY, UNSPECIFIED WHETHER SCIATICA PRESENT: ICD-10-CM

## 2025-08-05 PROCEDURE — 99204 OFFICE O/P NEW MOD 45 MIN: CPT | Performed by: INTERNAL MEDICINE

## 2025-08-05 PROCEDURE — 3008F BODY MASS INDEX DOCD: CPT | Performed by: INTERNAL MEDICINE

## 2025-08-05 PROCEDURE — 1036F TOBACCO NON-USER: CPT | Performed by: INTERNAL MEDICINE

## 2025-08-05 NOTE — PROGRESS NOTES
Subjective   Patient ID: [id number]   Isabel Mead is a 30 y.o. female who presents for Abnormal Lab (POS SHAWN ) and Joint Pain.  HPI    Referral for SHAWN 1:1280  Has been evaluated by two rheumatologists in the past  Patient has mainly c/o chronic lower back pain dating back many years  It is intermittent, without significant MST or nocturnal symptoms   With MRI showing DDD.  Has occasional bilateral knee pain, without any aggravating , relieving factors, and is usually self remitting and intermittent , no swelling, locking, giving out, grating or grinding sounds  She is trying to conceive and worried about SHAWN titre and wants to know if it is safe for her pregnancy  Denies fever, chills, weight loss, night sweats or headaches  Denies dry eyes, blurry vision, redness or pain or photophobia  Denies dry mouth, dental loss, loss of taste, nasal or oral ulcers, jaw claudication, difficulty swallowing, nasal crusting or recurrent sinus infections   Denies chest pain, palpitations, orthopnea  Denies shortness of breath, cough, asthma, or recurrent respiratory infections  Denies dysphagia, nausea, vomiting, heartburn, abdominal pain, constipation, diarrhea, melena or hematochezia  No recurrent urinary infections or STDs, no genital or anal ulcers.   Denies photosensitivity, rash or lesions, Raynaud's phenomenon, skin tightening, digital ulcers, psoriatic lesions, or alopecia  Denies any numbness or tingling, muscle weakness, or incontinence   Hematologic/Lymphatic: Denies bleeding, bruising, history of clots (arterial or venous), or abortions/miscarriages/pregnancy complications    No inflammatory back pain, enthesitis, dactylitis. No morning stiffness   Denies weakness, difficulty rising from chair or combing the hair, muscle aches, or problems with hand      FHx: No family history of autoimmune diseases       Problem List[1]     Medical History[2]     Surgical History[3]     Social History     Socioeconomic History     Marital status:      Spouse name: Not on file    Number of children: Not on file    Years of education: Not on file    Highest education level: Not on file   Occupational History    Not on file   Tobacco Use    Smoking status: Never    Smokeless tobacco: Never   Vaping Use    Vaping status: Never Used   Substance and Sexual Activity    Alcohol use: Yes     Comment: occas    Drug use: Never    Sexual activity: Not on file   Other Topics Concern    Not on file   Social History Narrative    Not on file     Social Drivers of Health     Financial Resource Strain: Low Risk  (3/17/2022)    Received from [facility]    Overall Financial Resource Strain (CARDIA)     Difficulty of Paying Living Expenses: Not very hard   Food Insecurity: No Food Insecurity (3/17/2022)    Received from [facility]    Hunger Vital Sign     Within the past 12 months, you worried that your food would run out before you got the money to buy more.: Never true     Within the past 12 months, the food you bought just didn't last and you didn't have money to get more.: Never true   Transportation Needs: No Transportation Needs (3/17/2022)    Received from [facility]    PRAPARE - Transportation     Lack of Transportation (Medical): No     Lack of Transportation (Non-Medical): No   Physical Activity: Not on file   Stress: Not on file   Social Connections: Not on file   Intimate Partner Violence: Not on file   Housing Stability: Not on file        RX Allergies[4]     Current Medications[5]       Objective     Visit Vitals  /72   Pulse 73        Physical Exam  General: AAOx3, Cooperative  Head: normocephalic, atraumatic  Eyes: EOMI, conjunctiva clear, sclera white, anicteric  Ears: no redness, swelling, tenderness  Nose: no deformity, no crusting   Throat/Mouth: No oral deformities, no cheek swelling, mucosa appear moist, no oral ulcers noted or loss of dentition  "  Neck/Lymph: FROM, trachea midline  Lungs: chest expansion symmetric. No respiratory distress.   Heart: RRR  Neuro: CN II-XII grossly intact, no focal deficit  Skin: No rashes, ulcers or photosensitive areas  MSK: Upper Extremities:  Hand/Fingers: No erythema, swelling, tenderness or warmth at DIP, PIP, or MCP joints, FROM grossly. Good hand . No nodules. No deformities   Wrists: No erythema, swelling, warmth or tenderness at wrist, FROM grossly  Elbows: No tenderness, swelling, erythema or warmth at elbows, FROM grossly. No nodules   Shoulders: No swelling, erythema, tenderness or warmth at shoulders. FROM  Lower Extremities:   Hips: No obvious deformities. No joint tenderness, normal ROM grossly. Log roll test negative bilaterally. Kinsey test is negative bilaterally. No trochanteric bursae TTP  Knees: No tenderness, deformities, swelling, rashes, or warmth, normal ROM grossly. No crepitus, no pes anserine bursa TTP   Ankles: No deformities, tenderness, edema, erythema, ulceration, or warmth at the ankle  Feet: Negative MTP squeeze. Normal ROM grossly.   Spine: No spinal tenderness to palpation. No SI joint tenderness. Gaenslen test negative       [unfilled]      Lab Results   Component Value Date    WBC 9.6 05/21/2025    HGB 11.4 (L) 05/21/2025    HCT 38.5 05/21/2025    MCV 67.3 (L) 05/21/2025     (H) 05/21/2025        Chemistry    Lab Results   Component Value Date/Time     05/21/2025 0820    K 4.0 05/21/2025 0820     05/21/2025 0820    CO2 27 05/21/2025 0820    BUN 12 05/21/2025 0820    CREATININE 0.67 05/21/2025 0820    Lab Results   Component Value Date/Time    CALCIUM 9.5 05/21/2025 0820    ALKPHOS 68 05/21/2025 0820    AST 15 05/21/2025 0820    ALT 17 05/21/2025 0820    BILITOT 1.0 05/21/2025 0820           No results found for: \"CRP\"   Lab Results   Component Value Date    SHAWN POSITIVE (A) 05/21/2025    SEDRATE 17 05/21/2025      No results found for: \"CKTOTAL\"  Lab Results " "  Component Value Date    NEUTROABS 4.50 12/29/2023      Lab Results   Component Value Date    FERRITIN 145 04/12/2023      Lab Results   Component Value Date    HEPATOT Reactive (A) 12/29/2023    HEPCAB Nonreactive 12/29/2023      Lab Results   Component Value Date    ALT 17 05/21/2025    AST 15 05/21/2025    GGT 15 12/29/2023    ALKPHOS 68 05/21/2025    BILITOT 1.0 05/21/2025      No results found for: \"PPD\"   No results found for: \"URICACID\"   Lab Results   Component Value Date    CALCIUM 9.5 05/21/2025      No results found for: \"SPEP\", \"UPEP\"   No results found for: \"ALBUR\", \"[id number]\"   .last          US PELVIS TRANSABDOMINAL WITH TRANSVAGINAL  Narrative: Interpreted By:  Grant Nicole,   STUDY:  US PELVIS TRANSABDOMINAL WITH TRANSVAGINAL;  5/21/2025 9:55 am      INDICATION:  Signs/Symptoms:pain right sided.      ,R10.2 Pelvic and perineal pain      COMPARISON:  None.      ACCESSION NUMBER(S):  [id number]      ORDERING CLINICIAN:  JAVI NOBLE      TECHNIQUE:  Multiple multiplanar static gray scale, color and spectral waveform  sonographic images of the pelvis were obtained. Transabdominal and  endovaginal ultrasound was performed.      FINDINGS:  UTERUS:  The uterus measures  3.0 cm x 2.6 cm x 7.2 cm. Normal myometrium      ENDOMETRIUM:  The endometrium measures a thickness of .5 cm, which is normal.      RIGHT ADNEXA:  The right ovary measures 1.8 cm x 2.3 cm x 3.1 cm and demonstrates  normal flow. Multiple follicles largest measuring 1.2 cm. No gross  right adnexal masses are seen, no hydrosalpinx.      LEFT ADNEXA:  The left ovary measures 2.2 cm x 1.9 cm x 2.7 cm and demonstrates  normal flow. Multiple subcentimeter follicles. No gross left adnexal  masses are seen, no hydrosalpinx.      CUL DE SAC:  No gross free fluid is seen in the pelvic cul-de-sac.      Impression: 1. No acute pelvic pathology. Bilateral ovarian follicles noted.      MACRO:  None      Signed by: Grant Nicole 5/22/2025 6:33 " AM  Dictation workstation:   [id number]     === 06/27/23 ===    XR KNEE 4+ VIEWS BILATERAL    - Impression -  Normal radiographs of the knees   === 01/31/24 ===    MR LIVER WO AND W CONTRAST    - Impression -  1. Arterial enhancing lobulated segment 2 hepatic lesion measuring  2.9 x 2.6 cm correlating with the recent ultrasound finding. Given  patient's age/demographics and in the absence of underlying cirrhosis  and primary malignancy, imaging characteristics would favor focal  nodular hyperplasia as the leading diagnostic consideration along  with hepatic adenoma as another alternative diagnostic consideration.    Recommendation:  Follow-up in 3 months with dedicated liver MRI with Hepatobiliary  phase/Eovist contrast specifically for confirmation and to ensure  stability.    MACRO:  None    Signed by: Grant Nicole 1/31/2024 2:27 PM  Dictation workstation:   [id number]          Assessment/Plan   Diagnoses and all orders for this visit:  Positive SHAWN (antinuclear antibody)  Patient was counseled that positive SHAWN alone is not diagnostic of any underlying autoimmune CTD and must be interpreted with in the right clinical context  Explained that SHAWN is a screening test and can be abnormal in upto 20% of healthy individuals. The risk of developing autoimmune disease with a low pre test probability in future is extremely low (< 1%).   Given she is trying to conceive and wants risk stratification, we will check SSA and APLS serologies  Previously JASON was negative, she has no s/s of autoimmune CTD otherwise  Chronic low back pain is mechanical and not related to SHAWN  She does not have features of IBP by ASAS criteria, so I am not pursuing this further    RTC after labs virtual visit  -     Anti-SSA; Future  -     Anti-Cardiolipin Antibody (IgA, IgG, and IgM); Future  -     Lupus Anticoag. With Interpretation[Cardenas]; Future  -     Beta-2 Glycoprotein Antibodies; Future         Feroz Lew MD FACP FACR  Assistant  Professor of Medicine  [employer] - Department of Rheumatology  [facility]   Plan, including risks and benefits, was discussed with the patient, informed on how to reach us.     To schedule an appointment, call  [phone] Anay or call [phone] for [facility]          [1]   Patient Active Problem List  Diagnosis    Adult lactase deficiency    Acne    Anemia    Anxiety disorder    Situational anxiety    Bulging lumbar disc    Chronic back pain    Fibromyalgia    Chronic RUQ pain    Dysphagia, idiopathic    Fatigue    Female pelvic pain    Frequent UTI    Recurrent candidiasis of vagina    GERD (gastroesophageal reflux disease)    H/O thalassemia minor    Heart palpitations    Abnormal thyroid function test    Hypertrophy of both inferior nasal turbinates    Leg length discrepancy    Lumbar radiculopathy    Migraine, chronic, without aura    Mixed hyperlipidemia    Nasal septal deviation    Overweight (BMI 25.0-29.9)    Panic attack as reaction to stress    PCOS (polycystic ovarian syndrome)    Sciatica of left side associated with disorder of lumbar spine    PMS (premenstrual syndrome)    UTI symptoms    Vitamin B12 deficiency    Vitamin D deficiency    Class 1 obesity due to excess calories with body mass index (BMI) of 30.0 to 30.9 in adult    Goiter    Dizziness   [2]   Past Medical History:  Diagnosis Date    History of mammogram 2018    wnl    Pap test, as part of routine gynecological examination 08/2023    wnl--gyn    Thalassemia trait 04/17/2018   [3]   Past Surgical History:  Procedure Laterality Date    ESOPHAGOGASTRODUODENOSCOPY  2018    TONSILLECTOMY  2008    WISDOM TOOTH EXTRACTION  2014   [4]   Allergies  Allergen Reactions    Penicillin Hives    Topamax [Topiramate] Other     confusion    Bactrim [Sulfamethoxazole-Trimethoprim] Other     vomiting    Doxycycline Hcl Other     Vomiting     Latex Rash   [5]   Current Outpatient Medications:     cholecalciferol (Vitamin D-3) 50  mcg (2,000 unit) capsule, Take 1 capsule (50 mcg) by mouth once daily., Disp: , Rfl:     ECHINACEA ORAL, Take by mouth once daily., Disp: , Rfl:     fluconazole (Diflucan) 150 mg tablet, Take 1 tablet (150 mg) by mouth 1 time., Disp: , Rfl:     INOSITOL ORAL, 1 tab twice daily, Disp: , Rfl:     IRON, FERROUS SULFATE, ORAL, Take 250 mg by mouth once daily., Disp: , Rfl:     lidocaine (Lidoderm) 5 % patch, Place 1 patch on the skin every 12 hours. Apply 1 patch and leave in place for 12 hours, then remove and leave off for 12 hours., Disp: , Rfl:     magnesium 250 mg tablet, Take 1 tablet (250 mg) by mouth once daily., Disp: , Rfl:     norgestimate-ethinyl estradioL (Tri-Lo-Estarylla) 0.18/0.215/0.25 mg-0.025 mg tablet, Take 1 tablet by mouth once daily., Disp: 84 tablet, Rfl: 3    SUMAtriptan (Imitrex) 100 mg tablet, TAKE 1 TABLET BY MOUTH FOR MIGRAINE RELIEF. MAY REPEAT 2 HOURS LATER. MAXIMUM 200MG/DAY., Disp: 9 tablet, Rfl: 3

## 2025-08-16 LAB
B2 GLYCOPROT1 IGA SER-ACNC: <2 U/ML
B2 GLYCOPROT1 IGG SER-ACNC: 4.1 U/ML
B2 GLYCOPROT1 IGM SER-ACNC: <2 U/ML
CARDIOLIPIN IGA SER IA-ACNC: <2 APL-U/ML
CARDIOLIPIN IGG SER IA-ACNC: 3.7 GPL-U/ML
CARDIOLIPIN IGM SER IA-ACNC: <2 MPL-U/ML
ENA SS-A AB SER IA-ACNC: NORMAL AI
LA 2 SCREEN W REFLEX-IMP: NORMAL
SCREEN APTT: NORMAL S
SCREEN DRVVT: NORMAL S

## 2025-08-19 LAB
B2 GLYCOPROT1 IGA SER-ACNC: <2 U/ML
B2 GLYCOPROT1 IGG SER-ACNC: 4.1 U/ML
B2 GLYCOPROT1 IGM SER-ACNC: <2 U/ML
CARDIOLIPIN IGA SER IA-ACNC: <2 APL-U/ML
CARDIOLIPIN IGG SER IA-ACNC: 3.7 GPL-U/ML
CARDIOLIPIN IGM SER IA-ACNC: <2 MPL-U/ML
CONFIRM APTT STACLOT: NEGATIVE
ENA SS-A AB SER IA-ACNC: NORMAL AI
LA 2 SCREEN W REFLEX-IMP: ABNORMAL
SCREEN APTT: 45 SEC
SCREEN DRVVT: 45 SEC

## 2025-08-26 ENCOUNTER — APPOINTMENT (OUTPATIENT)
Dept: RHEUMATOLOGY | Facility: CLINIC | Age: 30
End: 2025-08-26
Payer: COMMERCIAL

## 2025-08-26 DIAGNOSIS — R76.8 POSITIVE ANA (ANTINUCLEAR ANTIBODY): ICD-10-CM

## 2025-08-26 DIAGNOSIS — D68.9: Primary | ICD-10-CM

## 2025-08-26 PROCEDURE — 1036F TOBACCO NON-USER: CPT | Performed by: INTERNAL MEDICINE

## 2025-08-26 PROCEDURE — 99214 OFFICE O/P EST MOD 30 MIN: CPT | Performed by: INTERNAL MEDICINE
